# Patient Record
Sex: FEMALE | Race: WHITE | NOT HISPANIC OR LATINO | Employment: OTHER | URBAN - METROPOLITAN AREA
[De-identification: names, ages, dates, MRNs, and addresses within clinical notes are randomized per-mention and may not be internally consistent; named-entity substitution may affect disease eponyms.]

---

## 2019-10-17 ENCOUNTER — OFFICE VISIT (OUTPATIENT)
Dept: UROLOGY | Facility: CLINIC | Age: 84
End: 2019-10-17
Payer: MEDICARE

## 2019-10-17 VITALS
HEIGHT: 61 IN | SYSTOLIC BLOOD PRESSURE: 121 MMHG | WEIGHT: 153 LBS | HEART RATE: 80 BPM | TEMPERATURE: 98 F | BODY MASS INDEX: 28.89 KG/M2 | DIASTOLIC BLOOD PRESSURE: 70 MMHG

## 2019-10-17 DIAGNOSIS — N81.10 VAGINAL PROLAPSE: ICD-10-CM

## 2019-10-17 LAB
BILIRUB SERPL-MCNC: NEGATIVE MG/DL
BLOOD URINE, POC: NEGATIVE
COLOR, POC UA: YELLOW
GLUCOSE UR QL STRIP: NEGATIVE
KETONES UR QL STRIP: NEGATIVE
LEUKOCYTE ESTERASE URINE, POC: NEGATIVE
NITRITE, POC UA: NEGATIVE
PH, POC UA: 6.5
PROTEIN, POC: NEGATIVE
SPECIFIC GRAVITY, POC UA: 1.01
UROBILINOGEN, POC UA: 0.2

## 2019-10-17 PROCEDURE — 99499 UNLISTED E&M SERVICE: CPT | Mod: S$GLB,,, | Performed by: SPECIALIST

## 2019-10-17 PROCEDURE — 99499 NO LOS: ICD-10-PCS | Mod: S$GLB,,, | Performed by: SPECIALIST

## 2019-10-17 PROCEDURE — 51725 PR SIMPLE CYSTOMETROGRAM: ICD-10-PCS | Mod: 26,51,S$GLB, | Performed by: SPECIALIST

## 2019-10-17 PROCEDURE — 81002 URINALYSIS NONAUTO W/O SCOPE: CPT | Mod: S$GLB,,, | Performed by: SPECIALIST

## 2019-10-17 PROCEDURE — 52000 PR CYSTOURETHROSCOPY: ICD-10-PCS | Mod: 59,S$GLB,, | Performed by: SPECIALIST

## 2019-10-17 PROCEDURE — 81002 POCT URINE DIPSTICK WITHOUT MICROSCOPE: ICD-10-PCS | Mod: S$GLB,,, | Performed by: SPECIALIST

## 2019-10-17 PROCEDURE — 51725 SIMPLE CYSTOMETROGRAM: CPT | Mod: 26,51,S$GLB, | Performed by: SPECIALIST

## 2019-10-17 PROCEDURE — 52000 CYSTOURETHROSCOPY: CPT | Mod: 59,S$GLB,, | Performed by: SPECIALIST

## 2019-10-17 RX ORDER — MIRABEGRON 50 MG/1
TABLET, FILM COATED, EXTENDED RELEASE ORAL
COMMUNITY
Start: 2019-10-14 | End: 2019-11-21 | Stop reason: SDUPTHER

## 2019-10-17 RX ORDER — LATANOPROST 50 UG/ML
SOLUTION/ DROPS OPHTHALMIC
COMMUNITY
Start: 2019-09-25

## 2019-10-17 RX ORDER — LISINOPRIL AND HYDROCHLOROTHIAZIDE 12.5; 2 MG/1; MG/1
TABLET ORAL
COMMUNITY
Start: 2019-08-22 | End: 2021-06-25

## 2019-10-17 RX ORDER — CIPROFLOXACIN 500 MG/1
500 TABLET ORAL
Status: COMPLETED | OUTPATIENT
Start: 2019-10-17 | End: 2019-10-17

## 2019-10-17 RX ORDER — POTASSIUM CHLORIDE 750 MG/1
TABLET, EXTENDED RELEASE ORAL
COMMUNITY
Start: 2019-09-27 | End: 2023-12-06

## 2019-10-17 RX ORDER — CIPROFLOXACIN 500 MG/1
500 TABLET ORAL
Status: DISCONTINUED | OUTPATIENT
Start: 2019-10-17 | End: 2019-10-17

## 2019-10-17 RX ORDER — MELOXICAM 15 MG/1
TABLET ORAL
Refills: 1 | COMMUNITY
Start: 2019-10-02 | End: 2021-06-25

## 2019-10-17 RX ORDER — ROSUVASTATIN CALCIUM 10 MG/1
TABLET, COATED ORAL
COMMUNITY
Start: 2019-10-03

## 2019-10-17 RX ORDER — AMLODIPINE BESYLATE 5 MG/1
5 TABLET ORAL 2 TIMES DAILY
COMMUNITY
Start: 2019-08-22 | End: 2020-06-22 | Stop reason: ALTCHOICE

## 2019-10-17 RX ORDER — LEVOTHYROXINE SODIUM 100 UG/1
TABLET ORAL
COMMUNITY
End: 2021-06-25

## 2019-10-17 RX ADMIN — CIPROFLOXACIN 500 MG: 500 TABLET ORAL at 09:10

## 2019-10-17 NOTE — PROGRESS NOTES
Chief Complaint: POP    HPI: 82 y/o who has failed a previous POP repair    UA: normal, see lab results for values    Procedure: Diagnostic Cystoscopy/ Simple CMG and pelvic Exam    Procedure in Detail: After proper consents were obtained, the patient was prepped and draped in normal sterile fashion for diagnostic cystoscopy. 5 ml of lidocaine jelly was instilled in the urethra. A rigid cystoscope was introduced. Patient's bladder was surveiled in 4 quadrants. No abnormalities.     Simple CMG was performed. Bladder capacity was estimated at 350 cc, sensations were normal.    Pelvic exam was performed, patient was noted to have apical prolapse as well as a grade III cystocele, there was some minimal leakage of urine with valsalva.         Findings: normal    ABx: Ciprofloxacin 500 mg x 1        Impression/Plan:   Grade 3 cystocele with Vault prolapse.     - to the OR for Vaginal colpocleisis/perineorrhaphy with a MUS.   - I will look at a date and get patient scheduled.

## 2019-10-17 NOTE — PATIENT INSTRUCTIONS
Cystoscopy    Cystoscopy is a procedure that lets your doctor look directly inside your urethra and bladder. It can be used to:  · Help diagnose a problem with your urethra, bladder, or kidneys.  · Take a sample (biopsy) of bladder or urethral tissue.  · Treat certain problems (such as removing kidney stones).  · Place a stent to bypass an obstruction.  · Take special X-rays of the kidneys.  Based on the findings, your doctor may recommend other tests or treatments.  What is a cystoscope?  A cystoscope is a telescope-like instrument that contains lenses and fiberoptics (small glass wires that make bright light). The cystoscope may be straight and rigid, or flexible to bend around curves in the urethra. The doctor may look directly into the cystoscope, or project the image onto a monitor.  Getting ready  · Ask your doctor if you should stop taking any medicines before the procedure.  · Ask whether you should avoid eating or drinking anything after midnight before the procedure.  · Follow any other instructions your doctor gives you.  Tell your doctor before the exam if you:  · Take any medicines, such as aspirin or blood thinners  · Have allergies to any medicines  · Are pregnant   The procedure  Cystoscopy is done in the doctors office, surgery center, or hospital. The doctor and a nurse are present during the procedure. It takes only a few minutes, longer if a biopsy, X-ray, or treatment needs to be done.  During the procedure:  · You lie on an exam table on your back, knees bent and legs apart. You are covered with a drape.  · Your urethra and the area around it are washed. Anesthetic jelly may be applied to numb the urethra. Other pain medicine is usually not needed. In some cases, you may be offered a mild sedative to help you relax. If a more extensive procedure is to be done, such as a biopsy or kidney stone removal, general anesthesia may be needed.  · The cystoscope is inserted. A sterile fluid is put  into the bladder to expand it. You may feel pressure from this fluid.  · When the procedure is done, the cystoscope is removed.  After the procedure  If you had a sedative, general anesthesia, or spinal anesthesia, you must have someone drive you home. Once youre home:  · Drink plenty of fluids.  · You may have burning or light bleeding when you urinate--this is normal.  · Medicines may be prescribed to ease any discomfort or prevent infection. Take these as directed.  · Call your doctor if you have heavy bleeding or blood clots, burning that lasts more than a day, a fever over 100°F  (38° C), or trouble urinating.  Date Last Reviewed: 1/1/2017 © 2000-2017 [employer]. [address] [place] [address], [place] [address]. All rights reserved. This information is not intended as a substitute for professional medical care. Always follow your healthcare professional's instructions.        Pelvic Organ Prolapse  Pelvic organ prolapse is when 1 or more of the organs inside the pelvis (found between the waist and thighs), slip from their normal positions. Normally, muscles and tissues in the pelvic region support the pelvic organs and hold them in place.  What is a normal pelvis?     Cutaway view of pelvis showing the small intesting, bladder, pubic bone, urethra, pelvic floor muscles, uterus, vagina, and rectum.   A. The small intestine absorbs nutrients from food.  B. The bladder collects and holds urine.  C. The pubic bone helps protect the pelvic organs.  D. The urethra is the tube that carries urine out of the body.  E. The pelvic floor muscles support organs and other structures in the pelvis.  F. The uterus is where the baby develops when a women is pregnant.  G. The vagina is the canal from the uterus to the outside of the body.  H. The rectum stores stool until a bowel movement occurs.  What causes pelvic organ prolapse?   There are several causes of pelvic organ prolapse including:  · Vaginal  childbirth  · Genetic predisposition  · Connective tissue disorders  · Advancing age  · Constant coughing (such as with bronchitis or smoking)  · Heavy lifting  · Chronic straining (such as with constipation)  · Being overweight  What are the symptoms of pelvic organ prolapse?  The symptoms of pelvic organ prolapse include:  · A feeling of fullness or pressure in your pelvis  · A sense that a ball or lump is protruding from the vagina  · Problems passing urine or having a bowel movement  · Urine leakage when you cough or use stairs. (But this can happen even without prolapse.)   · Pain or pressure in your low back  · Problems with sexual intercourse  Date Last Reviewed: 5/10/2015  © 3367-3063 [employer]. [address] [place] [address], [place] [address]. All rights reserved. This information is not intended as a substitute for professional medical care. Always follow your healthcare professional's instructions.

## 2019-10-20 PROBLEM — N81.10 VAGINAL PROLAPSE: Status: ACTIVE | Noted: 2019-10-20

## 2019-10-20 NOTE — ASSESSMENT & PLAN NOTE
Patient is undergoing A Simple CMG, cystoscopy with pelvuic exam today as part of surgical  planning

## 2019-11-01 ENCOUNTER — CLINICAL SUPPORT (OUTPATIENT)
Dept: UROLOGY | Facility: CLINIC | Age: 84
End: 2019-11-01
Payer: MEDICARE

## 2019-11-01 DIAGNOSIS — N81.10 VAGINAL PROLAPSE: Primary | ICD-10-CM

## 2019-11-01 LAB
APPEARANCE, UA: CLEAR
BASOPHILS NFR SNV MANUAL: 0.9 % (ref 0–3)
BILIRUB UR QL STRIP: NEGATIVE MG/DL
BUN SERPL-MCNC: 34 MG/DL (ref 7–18)
BUN/CREAT SERPL: 36.55 RATIO (ref 7–18)
CALCIUM SERPL-MCNC: 9.8 MG/DL (ref 8.8–10.5)
CHLORIDE SERPL-SCNC: 102 MMOL/L (ref 100–108)
CO2 SERPL-SCNC: 33 MMOL/L (ref 21–32)
COLOR UR: NORMAL
CREAT SERPL-MCNC: 0.93 MG/DL (ref 0.55–1.02)
EOSINOPHIL NFR SNV MANUAL: 5.2 % (ref 1–3)
ERYTHROCYTE [DISTWIDTH] IN BLOOD BY AUTOMATED COUNT: 13.3 % (ref 12.5–18)
GFR ESTIMATION: 58
GLUCOSE (UA): NORMAL MG/DL
GLUCOSE SERPL-MCNC: 95 MG/DL (ref 70–110)
HCT VFR BLD AUTO: 36.1 % (ref 37–47)
HGB BLD-MCNC: 11.9 G/DL (ref 12–16)
HGB UR QL STRIP: NEGATIVE /UL
KETONES UR QL STRIP: NEGATIVE MG/DL
LEUKOCYTE ESTERASE UR QL STRIP: NEGATIVE /UL
LYMPHOCYTES NFR SNV MANUAL: 33 % (ref 25–40)
MANUAL NRBC PER 100 CELLS: 0 %
MCH RBC QN AUTO: 28.3 PG (ref 27–31.2)
MCHC RBC AUTO-ENTMCNC: 33 G/DL (ref 31.8–35.4)
MCV RBC AUTO: 86 FL (ref 80–97)
MONOCYTES/100 LEUKOCYTES: 6.3 % (ref 1–15)
NEUTROPHILS NFR BLD: 3.01 10*3/UL (ref 1.8–7.7)
NEUTROPHILS NFR SNV MANUAL: 54.4 % (ref 37–80)
NITRITE UR QL STRIP: NEGATIVE
PH UR STRIP: 7 PH (ref 5–9)
PLATELETS: 212 10*3/UL (ref 142–424)
POTASSIUM SERPL-SCNC: 3.4 MMOL/L (ref 3.6–5.2)
PROT UR QL STRIP: NEGATIVE MG/DL
RBC # BLD AUTO: 4.2 10*6/UL (ref 4.2–5.4)
SODIUM BLD-SCNC: 141 MMOL/L (ref 135–145)
SP GR UR STRIP: 1.01 (ref 1–1.03)
SPECIMEN COLLECTION METHOD, URINE: NORMAL
UROBILINOGEN UR STRIP-ACNC: NORMAL MG/DL
WBC # BLD: 5.5 10*3/UL (ref 4.6–10.2)

## 2019-11-05 ENCOUNTER — OUTSIDE PLACE OF SERVICE (OUTPATIENT)
Dept: UROLOGY | Facility: CLINIC | Age: 84
End: 2019-11-05
Payer: MEDICARE

## 2019-11-05 ENCOUNTER — TELEPHONE (OUTPATIENT)
Dept: UROLOGY | Facility: CLINIC | Age: 84
End: 2019-11-05

## 2019-11-05 PROCEDURE — 57288 REPAIR BLADDER DEFECT: CPT | Mod: 51,,, | Performed by: SPECIALIST

## 2019-11-05 PROCEDURE — 57288 PR SLING OPER STRES INCONTINENCE: ICD-10-PCS | Mod: 51,,, | Performed by: SPECIALIST

## 2019-11-05 PROCEDURE — 57265 CMBN AP COLPRHY W/NTRCL RPR: CPT | Mod: ,,, | Performed by: SPECIALIST

## 2019-11-05 PROCEDURE — 57265 PR COMBO ANT/POST COLPORR+ENTEROCELE: ICD-10-PCS | Mod: ,,, | Performed by: SPECIALIST

## 2019-11-05 NOTE — TELEPHONE ENCOUNTER
----- Message from Yanira Gibbons sent at 11/4/2019  3:14 PM CST -----  Contact: [facility]   Kamille called in regards to a clearence  for a procedure for the patient . Please call back at [phone]. Patient surgery is scheduled for tomorrow patient is in route now .  would like the information faxed over  ,Fax [phone]    Thanks,  Yanira Gibbons

## 2019-11-06 LAB
BUN SERPL-MCNC: 13 MG/DL (ref 7–18)
BUN/CREAT SERPL: 17.1 RATIO (ref 7–18)
CALCIUM SERPL-MCNC: 8.5 MG/DL (ref 8.8–10.5)
CHLORIDE SERPL-SCNC: 103 MMOL/L (ref 100–108)
CO2 SERPL-SCNC: 29 MMOL/L (ref 21–32)
CREAT SERPL-MCNC: 0.76 MG/DL (ref 0.55–1.02)
ERYTHROCYTE [DISTWIDTH] IN BLOOD BY AUTOMATED COUNT: 13.8 % (ref 12.5–18)
GFR ESTIMATION: > 60
GLUCOSE SERPL-MCNC: 96 MG/DL (ref 70–110)
HCT VFR BLD AUTO: 29.3 % (ref 37–47)
HGB BLD-MCNC: 9.5 G/DL (ref 12–16)
MANUAL NRBC PER 100 CELLS: 0 %
MCH RBC QN AUTO: 27.9 PG (ref 27–31.2)
MCHC RBC AUTO-ENTMCNC: 32.4 G/DL (ref 31.8–35.4)
MCV RBC AUTO: 86.2 FL (ref 80–97)
PLATELETS: 175 10*3/UL (ref 142–424)
POTASSIUM SERPL-SCNC: 3.8 MMOL/L (ref 3.6–5.2)
RBC # BLD AUTO: 3.4 10*6/UL (ref 4.2–5.4)
SODIUM BLD-SCNC: 138 MMOL/L (ref 135–145)
WBC # BLD: 6.7 10*3/UL (ref 4.6–10.2)

## 2019-11-11 ENCOUNTER — TELEPHONE (OUTPATIENT)
Dept: UROLOGY | Facility: CLINIC | Age: 84
End: 2019-11-11

## 2019-11-11 NOTE — TELEPHONE ENCOUNTER
----- Message from Glenny Centeno sent at 11/8/2019  3:28 PM CST -----  Contact: PATIENT   WOULD LIKE A RETURN CALL REGARDING A RX REFILL. PT CAN BE CONTACTED BEST @ [phone].  THANK YOU,   LUI MUÑOZ

## 2019-11-12 ENCOUNTER — TELEPHONE (OUTPATIENT)
Dept: UROLOGY | Facility: CLINIC | Age: 84
End: 2019-11-12

## 2019-11-12 NOTE — TELEPHONE ENCOUNTER
----- Message from Beverly Holloway sent at 11/11/2019 11:51 AM CST -----  Contact: pt  Type:  RX Refill Request    Who Called: MARY MALONEY   Refill or New Rx: refill  RX Name and Strength: Myrbetriq 50 mg  How is the patient currently taking it? (ex. 1XDay): 1 time daily  Is this a 30 day or 90 day RX: 90  Preferred Pharmacy with phone number:   [facility]    Local or Mail Order: Mail  Ordering Provider: Dr. Tucker  Would the patient rather a call back or a response via [facility]? Call  Best Call Back Number: [phone] (home)   Additional Information: Pt stated that a refill was sent over only for 1 month but pt stated that she would like a 3 month supply because it will be a lot cheaper instead of the 1 month supply.

## 2019-11-21 ENCOUNTER — OFFICE VISIT (OUTPATIENT)
Dept: UROLOGY | Facility: CLINIC | Age: 84
End: 2019-11-21
Payer: MEDICARE

## 2019-11-21 VITALS
DIASTOLIC BLOOD PRESSURE: 61 MMHG | RESPIRATION RATE: 18 BRPM | BODY MASS INDEX: 28.7 KG/M2 | WEIGHT: 152 LBS | HEIGHT: 61 IN | HEART RATE: 72 BPM | SYSTOLIC BLOOD PRESSURE: 129 MMHG

## 2019-11-21 DIAGNOSIS — N81.9 VAGINAL VAULT PROLAPSE: Primary | ICD-10-CM

## 2019-11-21 DIAGNOSIS — N32.81 OVERACTIVE BLADDER: ICD-10-CM

## 2019-11-21 LAB
BILIRUB UR QL STRIP: NEGATIVE
GLUCOSE UR QL STRIP: NEGATIVE
KETONES UR QL STRIP: NEGATIVE
LEUKOCYTE ESTERASE UR QL STRIP: NEGATIVE
PH, POC UA: 6
POC AMORP, URINE: ABNORMAL
POC BACTI, URINE: ABNORMAL
POC BLOOD, URINE: POSITIVE
POC CASTS, URINE: ABNORMAL
POC CRYST, URINE: ABNORMAL
POC EPITH, URINE: ABNORMAL
POC HCG, URINE: ABNORMAL
POC HYALIN, URINE: 0 LPF
POC MUCUS, URINE: ABNORMAL
POC NITRATES, URINE: NEGATIVE
POC OTHER, URINE: ABNORMAL
POC RBC, URINE: ABNORMAL HPF
POC WBC, URINE: ABNORMAL HPF
PROT UR QL STRIP: NEGATIVE
SP GR UR STRIP: 1.01 (ref 1–1.03)
UROBILINOGEN UR STRIP-ACNC: 0.2 (ref 0.1–1.1)

## 2019-11-21 PROCEDURE — 81001 POCT URINALYSIS (W/MICRO OPTION): ICD-10-PCS | Mod: S$GLB,,, | Performed by: NURSE PRACTITIONER

## 2019-11-21 PROCEDURE — 1159F PR MEDICATION LIST DOCUMENTED IN MEDICAL RECORD: ICD-10-PCS | Mod: S$GLB,,, | Performed by: NURSE PRACTITIONER

## 2019-11-21 PROCEDURE — 99214 PR OFFICE/OUTPT VISIT, EST, LEVL IV, 30-39 MIN: ICD-10-PCS | Mod: 25,S$GLB,, | Performed by: NURSE PRACTITIONER

## 2019-11-21 PROCEDURE — 1126F PR PAIN SEVERITY QUANTIFIED, NO PAIN PRESENT: ICD-10-PCS | Mod: S$GLB,,, | Performed by: NURSE PRACTITIONER

## 2019-11-21 PROCEDURE — 1159F MED LIST DOCD IN RCRD: CPT | Mod: S$GLB,,, | Performed by: NURSE PRACTITIONER

## 2019-11-21 PROCEDURE — 1126F AMNT PAIN NOTED NONE PRSNT: CPT | Mod: S$GLB,,, | Performed by: NURSE PRACTITIONER

## 2019-11-21 PROCEDURE — 81001 URINALYSIS AUTO W/SCOPE: CPT | Mod: S$GLB,,, | Performed by: NURSE PRACTITIONER

## 2019-11-21 PROCEDURE — 99214 OFFICE O/P EST MOD 30 MIN: CPT | Mod: 25,S$GLB,, | Performed by: NURSE PRACTITIONER

## 2019-11-21 NOTE — PROGRESS NOTES
Chief Complaint: f/u vaginal vault prolapse    HPI: 84 year old  female known to Dr. Tucker who presents for follow up vaginal vault prolapse.  Patient predominantly had a grade 2 cystocele with descent of the apex with a well-supported rectus.  She had undergone sacrospinous ligament suspension a few years ago in she failed that suspension.  She was initially planned for colpocleisis but intraoperatively a decision was made to do a A/P colporrhaphy with repair, perineorrhaphy, and placement of midurethral sling.  Patient did not have any complications postoperatively, was discharged home in stable condition.  She presents today with no complaints.  She denies any pelvic pain, hematuria, fever, or any other complications.      Allergies:  Codeine    Medications: has a current medication list which includes the following prescription(s): amlodipine, latanoprost, levothyroxine, lisinopril-hydrochlorothiazide, meloxicam, mirabegron, potassium chloride sa, and rosuvastatin.    Review of Systems:  General: No fever, chills, vision changes, dizziness, weakness, fatigue, unexplained weight loss, confusion, or mood swings.  Skin: No rashes, itching, or changes in color/texture of skin.  Chest: Denies HICKS, cyanosis, wheezing, cough, and sputum production.  Abdomen: Appetite fine. Denies diarrhea, abdominal pain, hematemesis, or blood in stool.  Musculoskeletal: No joint stiffness or swelling. Denies back pain.  : As above.  All other review of systems negative.    PMH:   has a past medical history of HTN (hypertension), Hyperlipidemia, Hyperthyroidism, IBS (irritable bowel syndrome), Incontinence of urine, OA (osteoarthritis), and Peripheral neuropathy.    PSH:   has a past surgical history that includes Hysterectomy and Vaginal prolapse repair.    FamHx: family history includes Hypertension in her father and mother.    SocHx:  reports that she has never smoked. She has never used smokeless tobacco. She reports  that she does not drink alcohol or use drugs.      Physical Exam:  Vitals:    11/21/19 0836   BP: 129/61   Pulse: 72   Resp: 18     General: AAOx3, no apparent distress, no deformities  Neck: supple, no masses, normal thyroid  Lungs: normal inspiration, no use of accessory muscles  Heart: regular rate and rhythm, no arrhythmias  Abdomen: soft, NT, ND, no masses, no hernias, no hepatosplenomegaly  Lymphatic: no unusually enlarged or tender lymph nodes  Skin: warm and dry, no jaundice.  Ext: without edema or deformity.  : deferred    Labs/Studies: UA shows RBCs 0-2/hpf otherwise negative    Impression/Plan:   Vaginal vault prolapse  Comments:  2 weeks s/p A/P colporrhaphy with repair, perineorrhaphy, and placement of midurethral sling, voices no complaints  Orders:  -     POCT Urinalysis (w/Micro Option)    Overactive bladder  Comments:  Continue Myrbetriq 50mg PO daily, [facility]  Orders:  -     mirabegron (MYRBETRIQ) 50 mg Tb24; Take 1 tablet (50 mg total) by mouth once daily.  Dispense: 90 tablet; Refill: 3        Follow up in about 4 weeks (around 12/19/2019).

## 2019-12-10 ENCOUNTER — TELEPHONE (OUTPATIENT)
Dept: UROLOGY | Facility: CLINIC | Age: 84
End: 2019-12-10

## 2019-12-10 NOTE — TELEPHONE ENCOUNTER
----- Message from Luis Worrell sent at 12/9/2019  1:42 PM CST -----  Contact: Pt   Pt would like a 3 month supply of mirabegron (MYRBETRIQ) 50 mg Tb24 sent to     [facility] - [address] [address]  Phone: [phone] Fax: [phone]    Pt can be reached at [phone] (home).

## 2019-12-10 NOTE — TELEPHONE ENCOUNTER
----- Message from Luis Worrell sent at 12/9/2019  1:42 PM CST -----  Contact: Pt   Pt would like a 3 month supply of mirabegron (MYRBETRIQ) 50 mg Tb24 sent to     [facility] - [address] [address]  Phone: [phone] Fax: [phone]    Pt can be reached at [phone] (home).

## 2019-12-20 ENCOUNTER — OFFICE VISIT (OUTPATIENT)
Dept: UROLOGY | Facility: CLINIC | Age: 84
End: 2019-12-20
Payer: MEDICARE

## 2019-12-20 VITALS — HEART RATE: 72 BPM | SYSTOLIC BLOOD PRESSURE: 130 MMHG | RESPIRATION RATE: 16 BRPM | DIASTOLIC BLOOD PRESSURE: 65 MMHG

## 2019-12-20 DIAGNOSIS — Z48.89 POSTOPERATIVE VISIT: Primary | ICD-10-CM

## 2019-12-20 PROCEDURE — 99024 POSTOP FOLLOW-UP VISIT: CPT | Mod: S$GLB,,, | Performed by: SPECIALIST

## 2019-12-20 PROCEDURE — 99024 PR POST-OP FOLLOW-UP VISIT: ICD-10-PCS | Mod: S$GLB,,, | Performed by: SPECIALIST

## 2019-12-20 NOTE — PROGRESS NOTES
Subjective:       Patient ID: Luna Miles is a 84 y.o. female.    Chief Complaint: Follow-up (4 week )      HPI:  84-year-old female had recurrent prolapse.  She underwent a posterior colporrhaphy anterior colporrhaphy perineorrhaphy and mid urethral sling at [facility] 11/05/2019.  She is presenting today for 1st vaginal exam.    She seems to be doing very well no symptoms at the moment.  Voids to completion empties her bladder very well.    About 3 years ago she did undergo sacral spinous ligament suspension that failed.  We had done just a single sided suspension.    Past Medical History:   Past Medical History:   Diagnosis Date    HTN (hypertension)     Hyperlipidemia     Hyperthyroidism     IBS (irritable bowel syndrome)     Incontinence of urine     OA (osteoarthritis)     Peripheral neuropathy        Past Surgical Historical:   Past Surgical History:   Procedure Laterality Date    HYSTERECTOMY      VAGINAL PROLAPSE REPAIR      Patient had a vaginal vault prolapse repair via a SSL suspension in 2015        Medications:   Medication List with Changes/Refills   Current Medications    AMLODIPINE (NORVASC) 5 MG TABLET    Take 5 mg by mouth 2 (two) times daily.     LATANOPROST 0.005 % OPHTHALMIC SOLUTION        LEVOTHYROXINE (SYNTHROID) 100 MCG TABLET    levothyroxine 100 mcg tablet   TK 1 T PO QD    LISINOPRIL-HYDROCHLOROTHIAZIDE (PRINZIDE,ZESTORETIC) 20-12.5 MG PER TABLET        MELOXICAM (MOBIC) 15 MG TABLET    TK 1 T PO QD    MIRABEGRON (MYRBETRIQ) 50 MG TB24    Take 1 tablet (50 mg total) by mouth once daily.    POTASSIUM CHLORIDE SA (K-DUR,KLOR-CON) 10 MEQ TABLET        ROSUVASTATIN (CRESTOR) 10 MG TABLET            Past Social History:   Social History     Socioeconomic History    Marital status:      Spouse name: Not on file    Number of children: Not on file    Years of education: Not on file    Highest education level: Not on file   Occupational History     Not on file   Social Needs    Financial resource strain: Not on file    Food insecurity:     Worry: Not on file     Inability: Not on file    Transportation needs:     Medical: Not on file     Non-medical: Not on file   Tobacco Use    Smoking status: Never Smoker    Smokeless tobacco: Never Used   Substance and Sexual Activity    Alcohol use: Never     Frequency: Never    Drug use: Never    Sexual activity: Not on file   Lifestyle    Physical activity:     Days per week: Not on file     Minutes per session: Not on file    Stress: Not on file   Relationships    Social connections:     Talks on phone: Not on file     Gets together: Not on file     Attends [employer] service: Not on file     Active member of club or organization: Not on file     Attends meetings of clubs or organizations: Not on file     Relationship status: Not on file   Other Topics Concern    Not on file   Social History Narrative    Not on file       Allergies:   Review of patient's allergies indicates:   Allergen Reactions    Codeine Rash        Family History:   Family History   Problem Relation Age of Onset    Hypertension Mother     Hypertension Father           Physical Exam:  Gen:  Alert and oriented x3; no acute distress  HEENT: NC/AT; PERRLA, Moist mucous membranes  Chest: CTAB  CVS: RR, Normal Rate, Normal cap refills  Abd: S/NT/ND, normal bowel sounds  :  Pelvic exam looks normal with adequate wall support      Assessment/Plan:       84-year-old female status post vaginal prolapse repair 11/05/2019 doing well    1.  Continue monitoring her return to clinic in 6 months for interim check    Problem List Items Addressed This Visit     None      Visit Diagnoses     Postoperative visit    -  Primary

## 2020-06-19 ENCOUNTER — TELEPHONE (OUTPATIENT)
Dept: UROLOGY | Facility: CLINIC | Age: 85
End: 2020-06-19

## 2020-06-19 NOTE — TELEPHONE ENCOUNTER
informed pt that call was to confirm her appt on monday 6/22/2020 at 0950 . pt verbalized understanding

## 2020-06-19 NOTE — TELEPHONE ENCOUNTER
----- Message from Tanisha Arriaga sent at 6/19/2020 10:39 AM CDT -----  Type:  Patient Returning Call    Who Called:Luna Miles  Who Left Message for Patient: N/A  Does the patient know what this is regarding?: Appointment reminder?  Would the patient rather a call back or a response via [facility]? Call back   Best Call Back Number:[phone] (cell)  Additional Information: Patient is stating that she has missed a call from Dr. Tucker's office

## 2020-06-22 ENCOUNTER — OFFICE VISIT (OUTPATIENT)
Dept: UROLOGY | Facility: CLINIC | Age: 85
End: 2020-06-22
Payer: MEDICARE

## 2020-06-22 VITALS
SYSTOLIC BLOOD PRESSURE: 141 MMHG | WEIGHT: 152 LBS | HEART RATE: 63 BPM | BODY MASS INDEX: 28.7 KG/M2 | DIASTOLIC BLOOD PRESSURE: 68 MMHG | RESPIRATION RATE: 17 BRPM | HEIGHT: 61 IN

## 2020-06-22 DIAGNOSIS — N81.10 VAGINAL PROLAPSE: Primary | ICD-10-CM

## 2020-06-22 LAB
BILIRUB UR QL STRIP: NEGATIVE
CLARITY, POC UA: CLEAR
COLOR, POC UA: YELLOW
GLUCOSE UR QL STRIP: NEGATIVE
KETONES UR QL STRIP: NEGATIVE
LEUKOCYTE ESTERASE UR QL STRIP: NEGATIVE
NITRITE, POC UA: NORMAL
PH, POC UA: 7
POC AMORP, URINE: NORMAL
POC BACTI, URINE: NORMAL
POC BLOOD, URINE: NEGATIVE
POC CASTS, URINE: NORMAL
POC CRYST, URINE: NORMAL
POC EPITH, URINE: NORMAL
POC HCG, URINE: NORMAL
POC HYALIN, URINE: 0 LPF
POC MUCUS, URINE: NORMAL
POC NITRATES, URINE: NEGATIVE
POC OTHER, URINE: NORMAL
POC RBC, URINE: 0 HPF
POC WBC, URINE: 0 HPF
PROT UR QL STRIP: NEGATIVE
SP GR UR STRIP: 1.02 (ref 1–1.03)
UROBILINOGEN UR STRIP-ACNC: 0.2 (ref 0.1–1.1)

## 2020-06-22 PROCEDURE — 99213 OFFICE O/P EST LOW 20 MIN: CPT | Mod: S$GLB,,, | Performed by: SPECIALIST

## 2020-06-22 PROCEDURE — 99213 PR OFFICE/OUTPT VISIT, EST, LEVL III, 20-29 MIN: ICD-10-PCS | Mod: S$GLB,,, | Performed by: SPECIALIST

## 2020-06-22 RX ORDER — BISOPROLOL FUMARATE 5 MG/1
TABLET, FILM COATED ORAL
COMMUNITY
Start: 2020-06-15

## 2020-06-22 RX ORDER — TRAMADOL HYDROCHLORIDE 50 MG/1
TABLET ORAL
COMMUNITY
Start: 2020-06-09 | End: 2021-06-25

## 2020-06-22 RX ORDER — MONTELUKAST SODIUM 10 MG/1
TABLET ORAL
COMMUNITY
Start: 2020-05-06 | End: 2023-12-06

## 2020-06-22 RX ORDER — LISINOPRIL AND HYDROCHLOROTHIAZIDE 20; 25 MG/1; MG/1
TABLET ORAL
COMMUNITY
Start: 2020-04-22

## 2020-06-22 RX ORDER — CYCLOBENZAPRINE HCL 5 MG
TABLET ORAL
COMMUNITY
Start: 2020-06-09 | End: 2022-06-29 | Stop reason: ALTCHOICE

## 2020-06-22 NOTE — PROGRESS NOTES
Subjective:       Patient ID: Luna Miles is a 84 y.o. female.    Chief Complaint: Other (6 mth f/u)      HPI:  84-year-old female who underwent a sacral spinous ligament suspension vaginal vault prolapse in March 2017.  By 2019 she did experience a failure.  She was going about a business and felt a pop and after that started experiencing prolapsing of the apex of the vagina.  In November 2019 she was taken back to the operating room for anterior posterior repair as well as a perineorrhaphy and placement of a sling.  She has been doing very well since that procedure.  She voids to completion.  She reports that she goes to the bathroom she feels like she has done then she tries again as he gets more urine coming out.  She denies any leakage.  Denies any infections.  BladderScan today was 17 cc.    Past Medical History:   Past Medical History:   Diagnosis Date    HTN (hypertension)     Hyperlipidemia     Hyperthyroidism     IBS (irritable bowel syndrome)     Incontinence of urine     OA (osteoarthritis)     Peripheral neuropathy        Past Surgical Historical:   Past Surgical History:   Procedure Laterality Date    HYSTERECTOMY      LAMINECTOMY      VAGINAL PROLAPSE REPAIR      Patient had a vaginal vault prolapse repair via a SSL suspension in 2015        Medications:   Medication List with Changes/Refills   Current Medications    BISOPROLOL (ZEBETA) 5 MG TABLET        CYCLOBENZAPRINE (FLEXERIL) 5 MG TABLET        LATANOPROST 0.005 % OPHTHALMIC SOLUTION        LEVOTHYROXINE (SYNTHROID) 100 MCG TABLET    levothyroxine 100 mcg tablet   TK 1 T PO QD    LISINOPRIL-HYDROCHLOROTHIAZIDE (PRINZIDE,ZESTORETIC) 20-12.5 MG PER TABLET        LISINOPRIL-HYDROCHLOROTHIAZIDE (PRINZIDE,ZESTORETIC) 20-25 MG TAB        MELOXICAM (MOBIC) 15 MG TABLET    TK 1 T PO QD    MIRABEGRON (MYRBETRIQ) 50 MG TB24    Take 1 tablet (50 mg total) by mouth once daily.    MONTELUKAST (SINGULAIR) 10 MG TABLET        POTASSIUM  CHLORIDE SA (K-DUR,KLOR-CON) 10 MEQ TABLET        ROSUVASTATIN (CRESTOR) 10 MG TABLET        TRAMADOL (ULTRAM) 50 MG TABLET       Discontinued Medications    AMLODIPINE (NORVASC) 5 MG TABLET    Take 5 mg by mouth 2 (two) times daily.         Past Social History:   Social History     Socioeconomic History    Marital status:      Spouse name: Not on file    Number of children: Not on file    Years of education: Not on file    Highest education level: Not on file   Occupational History    Not on file   Social Needs    Financial resource strain: Not on file    Food insecurity     Worry: Not on file     Inability: Not on file    Transportation needs     Medical: Not on file     Non-medical: Not on file   Tobacco Use    Smoking status: Never Smoker    Smokeless tobacco: Never Used   Substance and Sexual Activity    Alcohol use: Never     Frequency: Never    Drug use: Never    Sexual activity: Not on file   Lifestyle    Physical activity     Days per week: Not on file     Minutes per session: Not on file    Stress: Not on file   Relationships    Social connections     Talks on phone: Not on file     Gets together: Not on file     Attends [employer] service: Not on file     Active member of club or organization: Not on file     Attends meetings of clubs or organizations: Not on file     Relationship status: Not on file   Other Topics Concern    Not on file   Social History Narrative    Not on file       Allergies:   Review of patient's allergies indicates:   Allergen Reactions    Codeine Rash        Family History:   Family History   Problem Relation Age of Onset    Hypertension Mother     Hypertension Father         Review of Systems:   systems reviewed and notable for history of prolapse repair and sling placement  All other systems were reviewed Neg except as stated in the HPI    Physical Exam:  General: A&Ox3. No apparent distress. No deformities.  Neck: No masses. Normal thyroid.  Lungs:  normal inspiration. No use of accessory muscles.  Heart: normal pulse. No arrhythmias.  Abdomen: Soft. NT. ND. No masses. No hernias. No hepatosplenomegaly.  Lymphatic: Neck and groin nodes negative.  Skin: The skin is warm and dry. No jaundice.  Neurology: Cranial nerves 2-12 crossly intact, no focal weaknesses, no sensation deficits, no motor deficits  Ext: No clubbing, cyanosis or edema.  :  Deferred    Urinalysis:  Negative  Bladder scan for residual urine:  17 cc    Assessment/Plan:       84-year-old female who underwent a prolapse repair November 2019 here for follow-up.    1.  Bladder scan confirmed today that she is emptying her bladder  2.  Urinalysis was completely negative  3.  No clinical evidence of prolapse recurring  4.  Return to clinic in 1 year    Problem List Items Addressed This Visit        Renal/    Vaginal prolapse - Primary

## 2021-05-25 PROBLEM — M19.012 PRIMARY OSTEOARTHRITIS OF LEFT SHOULDER: Status: ACTIVE | Noted: 2021-05-25

## 2021-05-25 PROBLEM — Z96.619 PRESENCE OF SHOULDER JOINT PROSTHESIS: Status: ACTIVE | Noted: 2021-05-25

## 2021-06-25 ENCOUNTER — OFFICE VISIT (OUTPATIENT)
Dept: UROLOGY | Facility: CLINIC | Age: 86
End: 2021-06-25
Payer: MEDICARE

## 2021-06-25 DIAGNOSIS — N32.81 OVERACTIVE BLADDER: ICD-10-CM

## 2021-06-25 DIAGNOSIS — R39.15 URINARY URGENCY: ICD-10-CM

## 2021-06-25 DIAGNOSIS — N81.10 VAGINAL PROLAPSE: Primary | ICD-10-CM

## 2021-06-25 PROCEDURE — 99214 PR OFFICE/OUTPT VISIT, EST, LEVL IV, 30-39 MIN: ICD-10-PCS | Mod: S$GLB,,, | Performed by: NURSE PRACTITIONER

## 2021-06-25 PROCEDURE — 99214 OFFICE O/P EST MOD 30 MIN: CPT | Mod: S$GLB,,, | Performed by: NURSE PRACTITIONER

## 2021-06-25 RX ORDER — LEVOTHYROXINE SODIUM 88 UG/1
88 TABLET ORAL DAILY
COMMUNITY
Start: 2021-01-28

## 2021-06-25 RX ORDER — MIRABEGRON 50 MG/1
1 TABLET, FILM COATED, EXTENDED RELEASE ORAL DAILY
Qty: 90 TABLET | Refills: 3 | Status: SHIPPED | OUTPATIENT
Start: 2021-06-25 | End: 2023-12-06

## 2021-06-25 RX ORDER — SODIUM FLUORIDE 1.1 G/100G
CREAM ORAL
COMMUNITY
Start: 2021-06-04

## 2021-06-25 RX ORDER — MIRABEGRON 50 MG/1
1 TABLET, FILM COATED, EXTENDED RELEASE ORAL DAILY
Qty: 90 TABLET | Refills: 3 | Status: SHIPPED | OUTPATIENT
Start: 2021-06-25 | End: 2021-06-25 | Stop reason: SDUPTHER

## 2022-06-29 ENCOUNTER — OFFICE VISIT (OUTPATIENT)
Dept: UROLOGY | Facility: CLINIC | Age: 87
End: 2022-06-29
Payer: MEDICARE

## 2022-06-29 VITALS
BODY MASS INDEX: 32.1 KG/M2 | TEMPERATURE: 97 F | HEART RATE: 63 BPM | SYSTOLIC BLOOD PRESSURE: 169 MMHG | DIASTOLIC BLOOD PRESSURE: 78 MMHG | HEIGHT: 61 IN | WEIGHT: 170 LBS | RESPIRATION RATE: 20 BRPM

## 2022-06-29 DIAGNOSIS — N81.10 VAGINAL PROLAPSE: Primary | ICD-10-CM

## 2022-06-29 DIAGNOSIS — R39.15 URINARY URGENCY: ICD-10-CM

## 2022-06-29 DIAGNOSIS — N32.81 OAB (OVERACTIVE BLADDER): ICD-10-CM

## 2022-06-29 LAB — POC RESIDUAL URINE VOLUME: 79 ML (ref 0–100)

## 2022-06-29 PROCEDURE — 99214 PR OFFICE/OUTPT VISIT, EST, LEVL IV, 30-39 MIN: ICD-10-PCS | Mod: S$GLB,,, | Performed by: NURSE PRACTITIONER

## 2022-06-29 PROCEDURE — 51798 US URINE CAPACITY MEASURE: CPT | Mod: S$GLB,,, | Performed by: NURSE PRACTITIONER

## 2022-06-29 PROCEDURE — 99214 OFFICE O/P EST MOD 30 MIN: CPT | Mod: S$GLB,,, | Performed by: NURSE PRACTITIONER

## 2022-06-29 PROCEDURE — 51798 POCT BLADDER SCAN: ICD-10-PCS | Mod: S$GLB,,, | Performed by: NURSE PRACTITIONER

## 2022-06-29 RX ORDER — FUROSEMIDE 20 MG/1
TABLET ORAL
COMMUNITY
Start: 2022-02-12

## 2022-06-29 NOTE — PROGRESS NOTES
Subjective:       Patient ID: Luna Miles is a 86 y.o. female.    Chief Complaint: Urinary Incontinence and Urinary Frequency      HPI: 86-year-old female, established patient, presents for yearly visit.  Patient has a history of a vaginal prolapse.  Patient had surgical repair with Dr. Tucker.  Patient also has a history of urgency and overactive bladder.  Patient has been on Myrbetriq 50 mg.  Patient feels like this medication is longer providing any improvement.  She states he stopped taking it for a few days and noticed no change in symptoms.  Her biggest complaint is urgency.  She does not have any significant frequency or leakage.  Does have occasional leakage which is worse at night.  She does admit to drinking occasional tea.  She also drinks seltzer water.    She denies any pain or burning urination.  Denies any odor urine.  Denies any fever or body aches.  States he has a good stream from start to finish.  Denies having strain to void.  Denies any blood in urine.  No other urinary complaints at this time.       Past Medical History:   Past Medical History:   Diagnosis Date    HTN (hypertension)     Hyperlipidemia     Hyperthyroidism     IBS (irritable bowel syndrome)     Incontinence of urine     OA (osteoarthritis)     Peripheral neuropathy        Past Surgical Historical:   Past Surgical History:   Procedure Laterality Date    CARPAL TUNNEL RELEASE Right     FORAMINOTOMY  02/03/2020    L3-L5 [facility]IS-[facility]    HEMILAMINECTOMY  02/03/2020    L3-L5 MIS -[facility]    HYSTERECTOMY      HYSTERECTOMY      KNEE SURGERY Bilateral     [facility]     ROBOT-ASSISTED REPAIR OF BLADDER      SHOULDER SURGERY Left 12/09/2020    Reverse shoulder [facility]    SHOULDER SURGERY Right     RTC Repair-[facility]    SHOULDER SURGERY Left 12/09/2020    [facility] / [facility]    TONSILLECTOMY      VAGINAL PROLAPSE REPAIR      Patient had a vaginal vault prolapse repair via a SSL suspension in 2015        Medications:    Medication List with Changes/Refills   Current Medications    BISOPROLOL (ZEBETA) 5 MG TABLET        CYCLOBENZAPRINE (FLEXERIL) 5 MG TABLET        DENTA 5000 PLUS 1.1 % CREA    USE AS DIRECTED TWICE DAILY    FUROSEMIDE (LASIX) 20 MG TABLET        LATANOPROST 0.005 % OPHTHALMIC SOLUTION        LISINOPRIL-HYDROCHLOROTHIAZIDE (PRINZIDE,ZESTORETIC) 20-25 MG TAB        MONTELUKAST (SINGULAIR) 10 MG TABLET        MYRBETRIQ 50 MG TB24    Take 1 tablet (50 mg total) by mouth once daily.    POTASSIUM CHLORIDE SA (K-DUR,KLOR-CON) 10 MEQ TABLET        ROSUVASTATIN (CRESTOR) 10 MG TABLET        SYNTHROID 88 MCG TABLET    Take 88 mcg by mouth once daily.        Past Social History:   Social History     Socioeconomic History    Marital status:    Tobacco Use    Smoking status: Never Smoker    Smokeless tobacco: Never Used   Substance and Sexual Activity    Alcohol use: Never    Drug use: Never       Allergies:   Review of patient's allergies indicates:   Allergen Reactions    Codeine Rash        Family History:   Family History   Problem Relation Age of Onset    Hypertension Mother     Hypertension Father         Review of Systems:  Review of Systems   Constitutional: Negative for activity change and appetite change.   HENT: Negative for congestion and dental problem.    Respiratory: Negative for chest tightness and shortness of breath.    Cardiovascular: Negative for chest pain.   Gastrointestinal: Negative for abdominal distention and abdominal pain.   Genitourinary: Positive for urgency. Negative for decreased urine volume, difficulty urinating, dyspareunia, dysuria, enuresis, flank pain, frequency, genital sores, hematuria and pelvic pain.   Musculoskeletal: Negative for back pain and neck pain.   Allergic/Immunologic: Negative for immunocompromised state.   Neurological: Negative for dizziness.   Hematological: Negative for adenopathy.   Psychiatric/Behavioral: Negative for agitation, behavioral problems and  confusion.       Physical Exam:  Physical Exam  Vitals and nursing note reviewed.   Constitutional:       Appearance: She is well-developed.   HENT:      Head: Normocephalic.   Eyes:      Pupils: Pupils are equal, round, and reactive to light.   Cardiovascular:      Rate and Rhythm: Normal rate and regular rhythm.      Heart sounds: Normal heart sounds.   Pulmonary:      Effort: Pulmonary effort is normal.      Breath sounds: Normal breath sounds.   Abdominal:      General: Bowel sounds are normal.      Palpations: Abdomen is soft.   Musculoskeletal:         General: Normal range of motion.      Cervical back: Normal range of motion and neck supple.   Skin:     General: Skin is warm and dry.   Neurological:      Mental Status: She is alert and oriented to person, place, and time.   Psychiatric:         Behavior: Behavior normal.       Urinalysis:  Normal  Bladder scan:  79 cc.  Repeat bladder scan:  0 cc    Assessment/Plan:   1. Vaginal prolapse:  Patient doing well no complaints or complications.    2. Urinary urgency/overactive bladder:  Patient reports that it Myrbetriq 50 mg is no longer working.  Patient states that Dr. Tucker had discussed other medications which may have some side effects.  I discussed with the patient anticholinergic such as oxybutynin or VESIcare.  Discussed the risk of dementia.  Patient is hesitant to start these medications due to the risk of dementia.  We then discussed Botox injections.  Patient provided literature on Botox.  She will notify us if she would like to proceed.  She will notify us if she would like to meet with Dr. Soto for further questioning.    Follow-up to be arranged.  Problem List Items Addressed This Visit        Renal/    Vaginal prolapse - Primary    Overview     Patient had a sacral spinous ligament suspension of vaginal vault prolapse in March 2017.  In 2019 patient had failure.  In November 2019 patient had a anterior and posterior repair with sling.            Relevant Orders    POCT Bladder Scan      Other Visit Diagnoses     Urinary urgency        Relevant Orders    POCT Bladder Scan    POCT Urinalysis (w/Micro Option)    OAB (overactive bladder)        Relevant Orders    POCT Bladder Scan    POCT Urinalysis (w/Micro Option)

## 2022-08-15 ENCOUNTER — TELEPHONE (OUTPATIENT)
Dept: UROLOGY | Facility: CLINIC | Age: 87
End: 2022-08-15
Payer: MEDICARE

## 2022-08-15 NOTE — TELEPHONE ENCOUNTER
Patient called and stated after discussing with family and PCP would like to proceed with botox. [facility] LPN    ----- Message from Angeli Mcclain sent at 8/15/2022 11:05 AM CDT -----  Contact: pt      Who Called:partick   Does the patient know what this is regarding?:botox treatment   Would the patient rather a call back or a response via [facility]?   Best Call Back Number:.[phone]    Additional Information:

## 2022-08-23 ENCOUNTER — TELEPHONE (OUTPATIENT)
Dept: UROLOGY | Facility: CLINIC | Age: 87
End: 2022-08-23
Payer: MEDICARE

## 2022-08-23 NOTE — TELEPHONE ENCOUNTER
----- Message from Adam Martin NP sent at 8/16/2022  5:01 PM CDT -----  Contact: pt  86 year old female wants botox.  Last seen in June.      ----- Message -----  From: Savana Pearson RN  Sent: 8/15/2022  11:56 AM CDT  To: Adam Martin NP    Patient stated she has discussed botox treatment with family and PCP and would like to proceed with botox. [facility] LPN    ----- Message -----  From: Angeli Mcclain  Sent: 8/15/2022  11:06 AM CDT  To: Mario Medina Staff        Who Called:patrick   Does the patient know what this is regarding?:botox treatment   Would the patient rather a call back or a response via [facility]?   Best Call Back Number:.[phone]    Additional Information:

## 2022-08-26 ENCOUNTER — CLINICAL SUPPORT (OUTPATIENT)
Dept: UROLOGY | Facility: CLINIC | Age: 87
End: 2022-08-26
Payer: MEDICARE

## 2022-08-26 DIAGNOSIS — N32.81 OAB (OVERACTIVE BLADDER): Primary | ICD-10-CM

## 2022-08-26 DIAGNOSIS — R39.15 URINARY URGENCY: Primary | ICD-10-CM

## 2022-08-26 LAB
ANION GAP SERPL CALC-SCNC: 6 MMOL/L (ref 3–11)
APPEARANCE, UA: CLEAR
BASOPHILS NFR BLD: 1 % (ref 0–3)
BILIRUB UR QL STRIP: NEGATIVE MG/DL
BUN SERPL-MCNC: 18 MG/DL (ref 7–18)
BUN/CREAT SERPL: 25.35 RATIO (ref 7–18)
CALCIUM SERPL-MCNC: 9.1 MG/DL (ref 8.8–10.5)
CHLORIDE SERPL-SCNC: 104 MMOL/L (ref 100–108)
CO2 SERPL-SCNC: 31 MMOL/L (ref 21–32)
COLOR UR: NORMAL
CREAT SERPL-MCNC: 0.71 MG/DL (ref 0.55–1.02)
EOSINOPHIL NFR BLD: 4.4 % (ref 1–3)
ERYTHROCYTE [DISTWIDTH] IN BLOOD BY AUTOMATED COUNT: 13.6 % (ref 12.5–18)
GFR ESTIMATION: > 60
GLUCOSE (UA): NORMAL MG/DL
GLUCOSE SERPL-MCNC: 87 MG/DL (ref 70–110)
HCT VFR BLD AUTO: 37.7 % (ref 37–47)
HGB BLD-MCNC: 12.4 G/DL (ref 12–16)
HGB UR QL STRIP: NEGATIVE /UL
KETONES UR QL STRIP: NEGATIVE MG/DL
LEUKOCYTE ESTERASE UR QL STRIP: NEGATIVE /UL
LYMPHOCYTES NFR BLD: 34.5 % (ref 25–40)
MCH RBC QN AUTO: 28.4 PG (ref 27–31.2)
MCHC RBC AUTO-ENTMCNC: 32.9 G/DL (ref 31.8–35.4)
MCV RBC AUTO: 86.5 FL (ref 80–97)
MONOCYTES NFR BLD: 6.6 % (ref 1–15)
NEUTROPHILS # BLD AUTO: 2.65 10*3/UL (ref 1.8–7.7)
NEUTROPHILS NFR BLD: 53.3 % (ref 37–80)
NITRITE UR QL STRIP: NEGATIVE
NUCLEATED RED BLOOD CELLS: 0 %
PH UR STRIP: 7 PH (ref 5–9)
PLATELETS: 188 10*3/UL (ref 142–424)
POTASSIUM SERPL-SCNC: 3.6 MMOL/L (ref 3.6–5.2)
PROT UR QL STRIP: NEGATIVE MG/DL
RBC # BLD AUTO: 4.36 10*6/UL (ref 4.2–5.4)
SODIUM BLD-SCNC: 141 MMOL/L (ref 135–145)
SP GR UR STRIP: 1.01 (ref 1–1.03)
SPECIMEN COLLECTION METHOD, URINE: NORMAL
UROBILINOGEN UR STRIP-ACNC: NORMAL MG/DL
WBC # BLD: 5 10*3/UL (ref 4.6–10.2)

## 2022-08-26 NOTE — PROGRESS NOTES
Pre-op instructions and surgery consent BOTOX reviewed with pt. Pt verbalized understanding. Surgery consent signed by pt.

## 2022-08-29 ENCOUNTER — TELEPHONE (OUTPATIENT)
Dept: UROLOGY | Facility: CLINIC | Age: 87
End: 2022-08-29
Payer: MEDICARE

## 2022-08-29 NOTE — TELEPHONE ENCOUNTER
Contacted pt, advised that she does not need to stop taking Myrbetriq. Pt verbalized understanding. BJP    ----- Message from Angeli Mcclain sent at 8/29/2022 11:44 AM CDT -----  Contact: pt  Pt wants to know if she has to stoop taking mybetriq before getting botox injection   [phone]

## 2022-09-02 ENCOUNTER — TELEPHONE (OUTPATIENT)
Dept: UROLOGY | Facility: CLINIC | Age: 87
End: 2022-09-02
Payer: MEDICARE

## 2022-09-02 NOTE — TELEPHONE ENCOUNTER
Callback to pt, she states the soonest she can get in for her clearance is Tuesday at 2 and wants to know if that will be fine. Spoke with Mrs. Boggs she states that as long as they send/fax results to her, she will immediately send to [facility] so the pt can proceed with procedure. Advised pt, she verbalized understanding. RENEE

## 2022-09-07 ENCOUNTER — OUTSIDE PLACE OF SERVICE (OUTPATIENT)
Dept: UROLOGY | Facility: CLINIC | Age: 87
End: 2022-09-07

## 2022-09-07 PROCEDURE — 52287 CYSTOSCOPY CHEMODENERVATION: CPT | Mod: ,,, | Performed by: UROLOGY

## 2022-09-07 PROCEDURE — 52287 PR CYSTOURETHROSCOPY WITH INJ FOR CHEMODENERVATION: ICD-10-PCS | Mod: ,,, | Performed by: UROLOGY

## 2022-09-07 RX ORDER — HYDROCODONE BITARTRATE AND ACETAMINOPHEN 10; 325 MG/1; MG/1
1 TABLET ORAL EVERY 6 HOURS PRN
Qty: 10 TABLET | Refills: 0 | Status: SHIPPED | OUTPATIENT
Start: 2022-09-07 | End: 2023-03-10 | Stop reason: SDUPTHER

## 2022-09-07 RX ORDER — CIPROFLOXACIN 500 MG/1
500 TABLET ORAL 2 TIMES DAILY
Qty: 6 TABLET | Refills: 0 | Status: SHIPPED | OUTPATIENT
Start: 2022-09-07 | End: 2022-09-10

## 2022-09-29 ENCOUNTER — OFFICE VISIT (OUTPATIENT)
Dept: UROLOGY | Facility: CLINIC | Age: 87
End: 2022-09-29
Payer: MEDICARE

## 2022-09-29 VITALS — SYSTOLIC BLOOD PRESSURE: 130 MMHG | TEMPERATURE: 99 F | HEART RATE: 75 BPM | DIASTOLIC BLOOD PRESSURE: 85 MMHG

## 2022-09-29 DIAGNOSIS — N39.41 URGE URINARY INCONTINENCE: Primary | ICD-10-CM

## 2022-09-29 PROCEDURE — 99024 POSTOP FOLLOW-UP VISIT: CPT | Mod: S$GLB,,, | Performed by: UROLOGY

## 2022-09-29 PROCEDURE — 1126F PR PAIN SEVERITY QUANTIFIED, NO PAIN PRESENT: ICD-10-PCS | Mod: CPTII,S$GLB,, | Performed by: UROLOGY

## 2022-09-29 PROCEDURE — 1159F MED LIST DOCD IN RCRD: CPT | Mod: CPTII,S$GLB,, | Performed by: UROLOGY

## 2022-09-29 PROCEDURE — 99024 PR POST-OP FOLLOW-UP VISIT: ICD-10-PCS | Mod: S$GLB,,, | Performed by: UROLOGY

## 2022-09-29 PROCEDURE — 1159F PR MEDICATION LIST DOCUMENTED IN MEDICAL RECORD: ICD-10-PCS | Mod: CPTII,S$GLB,, | Performed by: UROLOGY

## 2022-09-29 PROCEDURE — 1126F AMNT PAIN NOTED NONE PRSNT: CPT | Mod: CPTII,S$GLB,, | Performed by: UROLOGY

## 2022-09-29 NOTE — PROGRESS NOTES
Postop Botox doing very well patient is retaining proximally 340 cc but is asymptomatic not having any issues with urinary tract infections.  Patient was instructed return to clinic when she has return of bothersome urinary symptoms

## 2023-02-10 ENCOUNTER — TELEPHONE (OUTPATIENT)
Dept: UROLOGY | Facility: CLINIC | Age: 88
End: 2023-02-10
Payer: MEDICARE

## 2023-02-10 NOTE — TELEPHONE ENCOUNTER
----- Message from Charisma Stanford sent at 2/10/2023 10:20 AM CST -----  Contact: self  Pt needs to schedule new botox injection she state the old one has bout wore out [facility] call [phone]   With appt details

## 2023-02-10 NOTE — TELEPHONE ENCOUNTER
Offered pt venus next avai. In April. She declined and req to see hanane. [facility]t [facility]. [facility]

## 2023-02-13 ENCOUNTER — OFFICE VISIT (OUTPATIENT)
Dept: UROLOGY | Facility: CLINIC | Age: 88
End: 2023-02-13
Payer: MEDICARE

## 2023-02-13 VITALS — DIASTOLIC BLOOD PRESSURE: 70 MMHG | SYSTOLIC BLOOD PRESSURE: 141 MMHG | HEART RATE: 68 BPM

## 2023-02-13 DIAGNOSIS — R39.15 URINARY URGENCY: ICD-10-CM

## 2023-02-13 DIAGNOSIS — N39.41 URGE INCONTINENCE: Primary | ICD-10-CM

## 2023-02-13 LAB — POC RESIDUAL URINE VOLUME: 5 ML (ref 0–100)

## 2023-02-13 PROCEDURE — 99214 OFFICE O/P EST MOD 30 MIN: CPT | Mod: S$GLB,,, | Performed by: NURSE PRACTITIONER

## 2023-02-13 PROCEDURE — 51798 POCT BLADDER SCAN: ICD-10-PCS | Mod: S$GLB,,, | Performed by: NURSE PRACTITIONER

## 2023-02-13 PROCEDURE — 1160F RVW MEDS BY RX/DR IN RCRD: CPT | Mod: CPTII,S$GLB,, | Performed by: NURSE PRACTITIONER

## 2023-02-13 PROCEDURE — 1159F MED LIST DOCD IN RCRD: CPT | Mod: CPTII,S$GLB,, | Performed by: NURSE PRACTITIONER

## 2023-02-13 PROCEDURE — 1160F PR REVIEW ALL MEDS BY PRESCRIBER/CLIN PHARMACIST DOCUMENTED: ICD-10-PCS | Mod: CPTII,S$GLB,, | Performed by: NURSE PRACTITIONER

## 2023-02-13 PROCEDURE — 1159F PR MEDICATION LIST DOCUMENTED IN MEDICAL RECORD: ICD-10-PCS | Mod: CPTII,S$GLB,, | Performed by: NURSE PRACTITIONER

## 2023-02-13 PROCEDURE — 51798 US URINE CAPACITY MEASURE: CPT | Mod: S$GLB,,, | Performed by: NURSE PRACTITIONER

## 2023-02-13 PROCEDURE — 99214 PR OFFICE/OUTPT VISIT, EST, LEVL IV, 30-39 MIN: ICD-10-PCS | Mod: S$GLB,,, | Performed by: NURSE PRACTITIONER

## 2023-02-13 RX ORDER — ASPIRIN 81 MG/1
81 TABLET ORAL DAILY
COMMUNITY

## 2023-02-13 NOTE — PROGRESS NOTES
Subjective:       Patient ID: Luna Miles is a 87 y.o. female.    Chief Complaint: No chief complaint on file.      HPI: 87-year-old female, established patient, seen September 2022.    Patient has history of urge incontinence urinary urgency.    On 09/07/2022 patient had 100 units of Botox.  Prior to that patient had failed Mybetriq.  Patient did have relief with Botox injections.  However, she states she is having return of symptoms.    She started to have episodes of urinary urgency.  She does have occasional leakage and frequency.      She denies any pain or burning urination.  Denies any odor urine.  Denies any fever body aches.  Denies any blood in urine.  Denies any significant weight loss.    No other urinary complaints at this time.       Past Medical History:   Past Medical History:   Diagnosis Date    HTN (hypertension)     Hyperlipidemia     Hyperthyroidism     IBS (irritable bowel syndrome)     Incontinence of urine     OA (osteoarthritis)     Peripheral neuropathy        Past Surgical Historical:   Past Surgical History:   Procedure Laterality Date    CARPAL TUNNEL RELEASE Right     FORAMINOTOMY  02/03/2020    L3-L5 MIS-[facility]    HEMILAMINECTOMY  02/03/2020    L3-L5 MIS -[facility]    HYSTERECTOMY      HYSTERECTOMY      KNEE SURGERY Bilateral     [facility]- [facility]     ROBOT-ASSISTED REPAIR OF BLADDER      SHOULDER SURGERY Left 12/09/2020    Reverse shoulder [facility]    SHOULDER SURGERY Right     RTC Repair-[facility]    SHOULDER SURGERY Left 12/09/2020    [facility] / [facility]    TONSILLECTOMY      VAGINAL PROLAPSE REPAIR      Patient had a vaginal vault prolapse repair via a SSL suspension in 2015        Medications:   Medication List with Changes/Refills   Current Medications    ASPIRIN (ECOTRIN) 81 MG EC TABLET    Take 81 mg by mouth once daily.    BISOPROLOL (ZEBETA) 5 MG TABLET        DENTA 5000 PLUS 1.1 % CREA    USE AS DIRECTED TWICE DAILY    FUROSEMIDE (LASIX) 20 MG TABLET        HYDROCODONE-ACETAMINOPHEN  (NORCO)  MG PER TABLET    Take 1 tablet by mouth every 6 (six) hours as needed for Pain.    LATANOPROST 0.005 % OPHTHALMIC SOLUTION        LISINOPRIL-HYDROCHLOROTHIAZIDE (PRINZIDE,ZESTORETIC) 20-25 MG TAB        MONTELUKAST (SINGULAIR) 10 MG TABLET        MYRBETRIQ 50 MG TB24    Take 1 tablet (50 mg total) by mouth once daily.    POTASSIUM CHLORIDE SA (K-DUR,KLOR-CON) 10 MEQ TABLET        ROSUVASTATIN (CRESTOR) 10 MG TABLET        SYNTHROID 88 MCG TABLET    Take 88 mcg by mouth once daily.        Past Social History:   Social History     Socioeconomic History    Marital status:    Tobacco Use    Smoking status: Never    Smokeless tobacco: Never   Substance and Sexual Activity    Alcohol use: Never    Drug use: Never       Allergies:   Review of patient's allergies indicates:   Allergen Reactions    Codeine Rash        Family History:   Family History   Problem Relation Age of Onset    Hypertension Mother     Hypertension Father         Review of Systems:  Review of Systems   Constitutional:  Negative for activity change and appetite change.   HENT:  Negative for congestion and dental problem.    Respiratory:  Negative for chest tightness and shortness of breath.    Cardiovascular:  Negative for chest pain.   Gastrointestinal:  Negative for abdominal distention and abdominal pain.   Genitourinary:  Positive for urgency. Negative for decreased urine volume, difficulty urinating, dyspareunia, dysuria, enuresis, flank pain, frequency, genital sores, hematuria and pelvic pain.   Musculoskeletal:  Negative for back pain and neck pain.   Allergic/Immunologic: Negative for immunocompromised state.   Neurological:  Negative for dizziness.   Hematological:  Negative for adenopathy.   Psychiatric/Behavioral:  Negative for agitation, behavioral problems and confusion.      Physical Exam:  Physical Exam  Vitals and nursing note reviewed.   Constitutional:       Appearance: She is well-developed.   HENT:      Head:  Normocephalic.   Eyes:      Pupils: Pupils are equal, round, and reactive to light.   Cardiovascular:      Rate and Rhythm: Normal rate and regular rhythm.      Heart sounds: Normal heart sounds.   Pulmonary:      Effort: Pulmonary effort is normal.      Breath sounds: Normal breath sounds.   Abdominal:      General: Bowel sounds are normal.      Palpations: Abdomen is soft.   Musculoskeletal:         General: Normal range of motion.      Cervical back: Normal range of motion and neck supple.   Skin:     General: Skin is warm and dry.   Neurological:      Mental Status: She is alert and oriented to person, place, and time.   Psychiatric:         Mood and Affect: Mood normal.         Behavior: Behavior normal.       Assessment/Plan:   Urge incontinence/urinary urgency:  Patient had Botox in September with improvement.    She is complaining of recurrence of symptoms.    She is just shy of 6 months.  We will arrange for repeat Botox at the 6 month roselyn.  She had 100 units at last time.      Follow-up to be arranged.  Problem List Items Addressed This Visit    None  Visit Diagnoses       Urge incontinence    -  Primary    Urinary urgency        Relevant Orders    POCT Bladder Scan

## 2023-02-22 ENCOUNTER — TELEPHONE (OUTPATIENT)
Dept: UROLOGY | Facility: CLINIC | Age: 88
End: 2023-02-22
Payer: MEDICARE

## 2023-02-22 NOTE — TELEPHONE ENCOUNTER
----- Message from Tonie Erickson sent at 2/22/2023 11:50 AM CST -----  Contact: Sadaf Talavera needs a call back at [phone], Regards to her nurse visit.    Thanks  Td

## 2023-02-22 NOTE — TELEPHONE ENCOUNTER
Pt needs sx clearance but her appointment with doctor for that is 3-1-23 at 1:00PM so she needed to change consent/education appointment. Done.

## 2023-03-01 DIAGNOSIS — N39.41 URGE INCONTINENCE: Primary | ICD-10-CM

## 2023-03-03 ENCOUNTER — CLINICAL SUPPORT (OUTPATIENT)
Dept: UROLOGY | Facility: CLINIC | Age: 88
End: 2023-03-03
Payer: MEDICARE

## 2023-03-03 NOTE — PROGRESS NOTES
Consented pt and went over instruction prior to procedure and after procedure. Pt verbalized understanding. ED

## 2023-03-06 LAB
ANION GAP SERPL CALC-SCNC: 6 MMOL/L (ref 3–11)
APPEARANCE, UA: CLEAR
BACTERIA SPEC CULT: ABNORMAL /HPF
BASOPHILS NFR BLD: 1.2 % (ref 0–3)
BILIRUB UR QL STRIP: NEGATIVE MG/DL
BUN SERPL-MCNC: 18 MG/DL (ref 7–18)
BUN/CREAT SERPL: 22.78 RATIO (ref 7–18)
CALCIUM SERPL-MCNC: 9.1 MG/DL (ref 8.8–10.5)
CHLORIDE SERPL-SCNC: 102 MMOL/L (ref 100–108)
CO2 SERPL-SCNC: 32 MMOL/L (ref 21–32)
COLOR UR: ABNORMAL
CREAT SERPL-MCNC: 0.79 MG/DL (ref 0.55–1.02)
EOSINOPHIL NFR BLD: 4.3 % (ref 1–3)
ERYTHROCYTE [DISTWIDTH] IN BLOOD BY AUTOMATED COUNT: 13.8 % (ref 12.5–18)
GFR ESTIMATION: > 60
GLUCOSE (UA): NORMAL MG/DL
GLUCOSE SERPL-MCNC: 93 MG/DL (ref 70–110)
HCT VFR BLD AUTO: 37.6 % (ref 37–47)
HGB BLD-MCNC: 12.2 G/DL (ref 12–16)
HGB UR QL STRIP: NEGATIVE /UL
KETONES UR QL STRIP: NEGATIVE MG/DL
LEUKOCYTE ESTERASE UR QL STRIP: 25 /UL
LYMPHOCYTES NFR BLD: 33.5 % (ref 25–40)
MCH RBC QN AUTO: 28.3 PG (ref 27–31.2)
MCHC RBC AUTO-ENTMCNC: 32.4 G/DL (ref 31.8–35.4)
MCV RBC AUTO: 87.2 FL (ref 80–97)
MONOCYTES NFR BLD: 7.2 % (ref 1–15)
NEUTROPHILS # BLD AUTO: 2.24 10*3/UL (ref 1.8–7.7)
NEUTROPHILS NFR BLD: 53.6 % (ref 37–80)
NITRITE UR QL STRIP: NEGATIVE
NUCLEATED RED BLOOD CELLS: 0 %
PH UR STRIP: 7 PH (ref 5–9)
PLATELETS: 174 10*3/UL (ref 142–424)
POTASSIUM SERPL-SCNC: 3.6 MMOL/L (ref 3.6–5.2)
PROT UR QL STRIP: NEGATIVE MG/DL
RBC # BLD AUTO: 4.31 10*6/UL (ref 4.2–5.4)
RBC #/AREA URNS HPF: ABNORMAL /HPF (ref 0–2)
SERVICE COMMENT 03: ABNORMAL
SODIUM BLD-SCNC: 140 MMOL/L (ref 135–145)
SP GR UR STRIP: 1.01 (ref 1–1.03)
SPECIMEN COLLECTION METHOD, URINE: ABNORMAL
SQUAMOUS EPITHELIAL, UA: ABNORMAL /LPF
UROBILINOGEN UR STRIP-ACNC: NORMAL MG/DL
WBC # BLD: 4.2 10*3/UL (ref 4.6–10.2)
WBC #/AREA URNS HPF: ABNORMAL /HPF (ref 0–5)

## 2023-03-10 ENCOUNTER — OUTSIDE PLACE OF SERVICE (OUTPATIENT)
Dept: UROLOGY | Facility: CLINIC | Age: 88
End: 2023-03-10

## 2023-03-10 PROCEDURE — 52287 CYSTOSCOPY CHEMODENERVATION: CPT | Mod: ,,, | Performed by: UROLOGY

## 2023-03-10 PROCEDURE — 52287 PR CYSTOURETHROSCOPY WITH INJ FOR CHEMODENERVATION: ICD-10-PCS | Mod: ,,, | Performed by: UROLOGY

## 2023-03-10 RX ORDER — CIPROFLOXACIN 500 MG/1
500 TABLET ORAL 2 TIMES DAILY
Qty: 6 TABLET | Refills: 0 | Status: SHIPPED | OUTPATIENT
Start: 2023-03-10 | End: 2023-03-12

## 2023-03-10 RX ORDER — HYDROCODONE BITARTRATE AND ACETAMINOPHEN 10; 325 MG/1; MG/1
1 TABLET ORAL EVERY 6 HOURS PRN
Qty: 10 TABLET | Refills: 0 | Status: CANCELLED | OUTPATIENT
Start: 2023-03-10

## 2023-03-10 RX ORDER — HYDROCODONE BITARTRATE AND ACETAMINOPHEN 10; 325 MG/1; MG/1
1 TABLET ORAL EVERY 6 HOURS PRN
Qty: 10 TABLET | Refills: 0 | Status: SHIPPED | OUTPATIENT
Start: 2023-03-10 | End: 2023-12-06

## 2023-04-04 ENCOUNTER — OFFICE VISIT (OUTPATIENT)
Dept: UROLOGY | Facility: CLINIC | Age: 88
End: 2023-04-04
Payer: MEDICARE

## 2023-04-04 DIAGNOSIS — R39.15 URINARY URGENCY: Primary | ICD-10-CM

## 2023-04-04 PROCEDURE — 1159F MED LIST DOCD IN RCRD: CPT | Mod: CPTII,S$GLB,, | Performed by: UROLOGY

## 2023-04-04 PROCEDURE — 1159F PR MEDICATION LIST DOCUMENTED IN MEDICAL RECORD: ICD-10-PCS | Mod: CPTII,S$GLB,, | Performed by: UROLOGY

## 2023-04-04 PROCEDURE — 99213 PR OFFICE/OUTPT VISIT, EST, LEVL III, 20-29 MIN: ICD-10-PCS | Mod: S$GLB,,, | Performed by: UROLOGY

## 2023-04-04 PROCEDURE — 1160F PR REVIEW ALL MEDS BY PRESCRIBER/CLIN PHARMACIST DOCUMENTED: ICD-10-PCS | Mod: CPTII,S$GLB,, | Performed by: UROLOGY

## 2023-04-04 PROCEDURE — 1160F RVW MEDS BY RX/DR IN RCRD: CPT | Mod: CPTII,S$GLB,, | Performed by: UROLOGY

## 2023-04-04 PROCEDURE — 99213 OFFICE O/P EST LOW 20 MIN: CPT | Mod: S$GLB,,, | Performed by: UROLOGY

## 2023-04-04 NOTE — PROGRESS NOTES
Subjective:       Patient ID: Luna Miles is a 87 y.o. female.    Chief Complaint: botox 100 units F/U      HPI:  87-year-old female status post 100 units of Botox patient feels well has no issues she states she has significant improvement no longer wear pads during the day only at night    Past Medical History:   Past Medical History:   Diagnosis Date    HTN (hypertension)     Hyperlipidemia     Hyperthyroidism     IBS (irritable bowel syndrome)     Incontinence of urine     OA (osteoarthritis)     Peripheral neuropathy        Past Surgical Historical:   Past Surgical History:   Procedure Laterality Date    CARPAL TUNNEL RELEASE Right     FORAMINOTOMY  02/03/2020    L3-L5 MIS-[facility]    HEMILAMINECTOMY  02/03/2020    L3-L5 MIS -[facility]    HYSTERECTOMY      HYSTERECTOMY      KNEE SURGERY Bilateral     [facility]- [facility]     ROBOT-ASSISTED REPAIR OF BLADDER      SHOULDER SURGERY Left 12/09/2020    Reverse shoulder [facility]    SHOULDER SURGERY Right     RTC Repair-[facility]    SHOULDER SURGERY Left 12/09/2020    [facility] / [facility]    TONSILLECTOMY      VAGINAL PROLAPSE REPAIR      Patient had a vaginal vault prolapse repair via a SSL suspension in 2015        Medications:   Medication List with Changes/Refills   Current Medications    ASPIRIN (ECOTRIN) 81 MG EC TABLET    Take 81 mg by mouth once daily.    BISOPROLOL (ZEBETA) 5 MG TABLET        DENTA 5000 PLUS 1.1 % CREA    USE AS DIRECTED TWICE DAILY    FUROSEMIDE (LASIX) 20 MG TABLET        HYDROCODONE-ACETAMINOPHEN (NORCO)  MG PER TABLET    Take 1 tablet by mouth every 6 (six) hours as needed for Pain.    LATANOPROST 0.005 % OPHTHALMIC SOLUTION        LISINOPRIL-HYDROCHLOROTHIAZIDE (PRINZIDE,ZESTORETIC) 20-25 MG TAB        MONTELUKAST (SINGULAIR) 10 MG TABLET        MYRBETRIQ 50 MG TB24    Take 1 tablet (50 mg total) by mouth once daily.    POTASSIUM CHLORIDE SA (K-DUR,KLOR-CON) 10 MEQ TABLET        ROSUVASTATIN (CRESTOR) 10 MG TABLET        SYNTHROID 88 MCG TABLET     Take 88 mcg by mouth once daily.        Past Social History:   Social History     Socioeconomic History    Marital status:    Tobacco Use    Smoking status: Never    Smokeless tobacco: Never   Substance and Sexual Activity    Alcohol use: Never    Drug use: Never       Allergies:   Review of patient's allergies indicates:   Allergen Reactions    Codeine Rash        Family History:   Family History   Problem Relation Age of Onset    Hypertension Mother     Hypertension Father         Review of Systems:  Review of Systems   Constitutional:  Negative for activity change and appetite change.   HENT:  Negative for congestion and dental problem.    Eyes:  Negative for pain and discharge.   Respiratory:  Negative for chest tightness and shortness of breath.    Cardiovascular:  Negative for chest pain.   Gastrointestinal:  Negative for abdominal distention and abdominal pain.   Endocrine: Negative for cold intolerance, heat intolerance and polyuria.   Genitourinary:  Negative for decreased urine volume, difficulty urinating, dyspareunia, dysuria, enuresis, flank pain, frequency, genital sores, hematuria, menstrual problem, pelvic pain, urgency, vaginal bleeding, vaginal discharge and vaginal pain.   Musculoskeletal:  Negative for back pain and neck pain.   Skin:  Negative for color change and rash.   Allergic/Immunologic: Negative for immunocompromised state.   Neurological:  Negative for dizziness.   Hematological:  Negative for adenopathy.   Psychiatric/Behavioral:  Negative for agitation, behavioral problems and confusion.      Physical Exam:  Physical Exam  Constitutional:       Appearance: She is well-developed.   HENT:      Head: Normocephalic and atraumatic.      Right Ear: External ear normal.      Left Ear: External ear normal.   Eyes:      Conjunctiva/sclera: Conjunctivae normal.   Neck:      Vascular: No JVD.   Cardiovascular:      Rate and Rhythm: Normal rate and regular rhythm.   Pulmonary:      Effort:  Pulmonary effort is normal. No respiratory distress.      Breath sounds: Normal breath sounds. No wheezing.   Abdominal:      General: There is no distension.      Palpations: Abdomen is soft.      Tenderness: There is no abdominal tenderness. There is no rebound.   Musculoskeletal:         General: Normal range of motion.      Cervical back: Normal range of motion.   Skin:     General: Skin is warm and dry.      Findings: No erythema or rash.   Neurological:      Mental Status: She is alert and oriented to person, place, and time.   Psychiatric:         Behavior: Behavior normal.       Assessment/Plan:       Problem List Items Addressed This Visit    None         Successful Botox placement return to clinic when

## 2023-05-23 ENCOUNTER — PATIENT MESSAGE (OUTPATIENT)
Dept: RESEARCH | Facility: HOSPITAL | Age: 88
End: 2023-05-23
Payer: MEDICARE

## 2023-11-27 ENCOUNTER — TELEPHONE (OUTPATIENT)
Dept: UROLOGY | Facility: CLINIC | Age: 88
End: 2023-11-27
Payer: MEDICARE

## 2023-11-27 NOTE — TELEPHONE ENCOUNTER
Attempted to return call. I see pt already has an joshua with a provider.     ----- Message from Griselda Fischer sent at 11/27/2023  2:34 PM CST -----  Contact: self  Type: Staff Message    Caller: Luna Miles  Call Back Number: [phone]  Nature of the Call: pt is needing an apt for botox  Additional Information: na

## 2023-11-29 ENCOUNTER — OFFICE VISIT (OUTPATIENT)
Dept: UROLOGY | Facility: CLINIC | Age: 88
End: 2023-11-29
Payer: MEDICARE

## 2023-11-29 VITALS — BODY MASS INDEX: 32.1 KG/M2 | WEIGHT: 170 LBS | HEIGHT: 61 IN

## 2023-11-29 DIAGNOSIS — R39.15 URINARY URGENCY: ICD-10-CM

## 2023-11-29 DIAGNOSIS — N39.41 URGE INCONTINENCE: Primary | ICD-10-CM

## 2023-11-29 PROCEDURE — 51798 US URINE CAPACITY MEASURE: CPT | Mod: S$GLB,,, | Performed by: NURSE PRACTITIONER

## 2023-11-29 PROCEDURE — 51798 POCT BLADDER SCAN: ICD-10-PCS | Mod: S$GLB,,, | Performed by: NURSE PRACTITIONER

## 2023-11-29 PROCEDURE — 1159F PR MEDICATION LIST DOCUMENTED IN MEDICAL RECORD: ICD-10-PCS | Mod: CPTII,S$GLB,, | Performed by: NURSE PRACTITIONER

## 2023-11-29 PROCEDURE — 99214 OFFICE O/P EST MOD 30 MIN: CPT | Mod: S$GLB,,, | Performed by: NURSE PRACTITIONER

## 2023-11-29 PROCEDURE — 1159F MED LIST DOCD IN RCRD: CPT | Mod: CPTII,S$GLB,, | Performed by: NURSE PRACTITIONER

## 2023-11-29 PROCEDURE — 1160F RVW MEDS BY RX/DR IN RCRD: CPT | Mod: CPTII,S$GLB,, | Performed by: NURSE PRACTITIONER

## 2023-11-29 PROCEDURE — 1160F PR REVIEW ALL MEDS BY PRESCRIBER/CLIN PHARMACIST DOCUMENTED: ICD-10-PCS | Mod: CPTII,S$GLB,, | Performed by: NURSE PRACTITIONER

## 2023-11-29 PROCEDURE — 99214 PR OFFICE/OUTPT VISIT, EST, LEVL IV, 30-39 MIN: ICD-10-PCS | Mod: S$GLB,,, | Performed by: NURSE PRACTITIONER

## 2023-11-29 NOTE — PROGRESS NOTES
Subjective:       Patient ID: Luna Miles is a 88 y.o. female.    Chief Complaint: No chief complaint on file.      HPI: 88-year-old female, established patient, last seen 6 months ago.    Patient has history of urge incontinence with urinary urgency.    She has failed multiple oral medications.    Patient has been having success with Botox.  Her last Botox injection was in April 2023.  She also had Botox injection in September 2022.    Patient states that her symptoms have returned.  She is complaining of episodes of urgency with leakage.  She would like to proceed with repeat Botox.      No other urinary complaints at this time.         Past Medical History:   Past Medical History:   Diagnosis Date    HTN (hypertension)     Hyperlipidemia     Hyperthyroidism     IBS (irritable bowel syndrome)     Incontinence of urine     OA (osteoarthritis)     Peripheral neuropathy        Past Surgical Historical:   Past Surgical History:   Procedure Laterality Date    CARPAL TUNNEL RELEASE Right     FORAMINOTOMY  02/03/2020    L3-L5 MIS-[facility]    HEMILAMINECTOMY  02/03/2020    L3-L5 MIS -[facility]    HYSTERECTOMY      HYSTERECTOMY      KNEE SURGERY Bilateral     TKA- [facility]     ROBOT-ASSISTED REPAIR OF BLADDER      SHOULDER SURGERY Left 12/09/2020    Reverse shoulder [facility]    SHOULDER SURGERY Right     RTC Repair-[facility]    SHOULDER SURGERY Left 12/09/2020    [facility] / [facility]    TONSILLECTOMY      VAGINAL PROLAPSE REPAIR      Patient had a vaginal vault prolapse repair via a SSL suspension in 2015        Medications:   Medication List with Changes/Refills   Current Medications    ASPIRIN (ECOTRIN) 81 MG EC TABLET    Take 81 mg by mouth once daily.    BISOPROLOL (ZEBETA) 5 MG TABLET        DENTA 5000 PLUS 1.1 % CREA    USE AS DIRECTED TWICE DAILY    FUROSEMIDE (LASIX) 20 MG TABLET        HYDROCODONE-ACETAMINOPHEN (NORCO)  MG PER TABLET    Take 1 tablet by mouth every 6 (six) hours as needed for Pain.    LATANOPROST  0.005 % OPHTHALMIC SOLUTION        LISINOPRIL-HYDROCHLOROTHIAZIDE (PRINZIDE,ZESTORETIC) 20-25 MG TAB        MONTELUKAST (SINGULAIR) 10 MG TABLET        MYRBETRIQ 50 MG TB24    Take 1 tablet (50 mg total) by mouth once daily.    POTASSIUM CHLORIDE SA (K-DUR,KLOR-CON) 10 MEQ TABLET        ROSUVASTATIN (CRESTOR) 10 MG TABLET        SYNTHROID 88 MCG TABLET    Take 88 mcg by mouth once daily.        Past Social History:   Social History     Socioeconomic History    Marital status:    Tobacco Use    Smoking status: Never    Smokeless tobacco: Never   Substance and Sexual Activity    Alcohol use: Never    Drug use: Never       Allergies:   Review of patient's allergies indicates:   Allergen Reactions    Codeine Rash        Family History:   Family History   Problem Relation Age of Onset    Hypertension Mother     Hypertension Father         Review of Systems:  Review of Systems   Constitutional:  Negative for activity change and appetite change.   HENT:  Negative for congestion and dental problem.    Respiratory:  Negative for chest tightness and shortness of breath.    Cardiovascular:  Negative for chest pain.   Gastrointestinal:  Negative for abdominal distention and abdominal pain.   Genitourinary:  Positive for urgency. Negative for decreased urine volume, difficulty urinating, dyspareunia, dysuria, enuresis, flank pain, frequency, genital sores, hematuria and pelvic pain.   Musculoskeletal:  Negative for back pain and neck pain.   Allergic/Immunologic: Negative for immunocompromised state.   Neurological:  Negative for dizziness.   Hematological:  Negative for adenopathy.   Psychiatric/Behavioral:  Negative for agitation, behavioral problems and confusion.        Physical Exam:  Physical Exam  Vitals and nursing note reviewed.   Constitutional:       Appearance: She is well-developed.   HENT:      Head: Normocephalic.   Eyes:      Pupils: Pupils are equal, round, and reactive to light.   Cardiovascular:       Rate and Rhythm: Normal rate and regular rhythm.      Heart sounds: Normal heart sounds.   Pulmonary:      Effort: Pulmonary effort is normal.      Breath sounds: Normal breath sounds.   Abdominal:      General: Bowel sounds are normal.      Palpations: Abdomen is soft.   Musculoskeletal:         General: Normal range of motion.      Cervical back: Normal range of motion and neck supple.   Skin:     General: Skin is warm and dry.   Neurological:      Mental Status: She is alert and oriented to person, place, and time.   Psychiatric:         Mood and Affect: Mood normal.         Behavior: Behavior normal.       Bladder scan:  0 cc    Assessment/Plan:     Urge incontinence/urinary urgency:  Patient has had success with Botox injections.  Last Botox injection was in April 2023.    Patient like to proceed with repeat Botox.    Will arrange Botox injections with Dr. Aguila, 100 units.      Follow-up to be arranged.  Problem List Items Addressed This Visit    None  Visit Diagnoses       Urge incontinence    -  Primary    Relevant Orders    POCT Bladder Scan    Urinary urgency        Relevant Orders    POCT Bladder Scan

## 2023-12-06 ENCOUNTER — TELEPHONE (OUTPATIENT)
Dept: UROLOGY | Facility: CLINIC | Age: 88
End: 2023-12-06

## 2023-12-06 ENCOUNTER — CLINICAL SUPPORT (OUTPATIENT)
Dept: UROLOGY | Facility: CLINIC | Age: 88
End: 2023-12-06
Payer: MEDICARE

## 2023-12-06 DIAGNOSIS — N39.41 URGE INCONTINENCE: Primary | ICD-10-CM

## 2023-12-06 LAB
ANION GAP SERPL CALC-SCNC: 8 MMOL/L (ref 3–11)
APPEARANCE, UA: CLEAR
BACTERIA SPEC CULT: ABNORMAL /HPF
BASOPHILS NFR BLD: 1.2 % (ref 0–3)
BILIRUB UR QL STRIP: NEGATIVE MG/DL
BUN SERPL-MCNC: 30 MG/DL (ref 7–18)
BUN/CREAT SERPL: 27.77 RATIO (ref 7–18)
CALCIUM SERPL-MCNC: 8.9 MG/DL (ref 8.8–10.5)
CHLORIDE SERPL-SCNC: 103 MMOL/L (ref 100–108)
CO2 SERPL-SCNC: 28 MMOL/L (ref 21–32)
COLOR UR: ABNORMAL
CREAT SERPL-MCNC: 1.08 MG/DL (ref 0.55–1.02)
EOSINOPHIL NFR BLD: 5.1 % (ref 1–3)
ERYTHROCYTE [DISTWIDTH] IN BLOOD BY AUTOMATED COUNT: 13.7 % (ref 12.5–18)
GFR ESTIMATION: 48
GLUCOSE (UA): NORMAL MG/DL
GLUCOSE SERPL-MCNC: 107 MG/DL (ref 70–110)
HCT VFR BLD AUTO: 35.8 % (ref 37–47)
HGB BLD-MCNC: 11.9 G/DL (ref 12–16)
HGB UR QL STRIP: NEGATIVE /UL
KETONES UR QL STRIP: NEGATIVE MG/DL
LEUKOCYTE ESTERASE UR QL STRIP: 25 /UL
LYMPHOCYTES NFR BLD: 24.5 % (ref 25–40)
MCH RBC QN AUTO: 28.1 PG (ref 27–31.2)
MCHC RBC AUTO-ENTMCNC: 33.2 G/DL (ref 31.8–35.4)
MCV RBC AUTO: 84.6 FL (ref 80–97)
MONOCYTES NFR BLD: 8.1 % (ref 1–15)
NEUTROPHILS # BLD AUTO: 3.09 10*3/UL (ref 1.8–7.7)
NEUTROPHILS NFR BLD: 60.9 % (ref 37–80)
NITRITE UR QL STRIP: NEGATIVE
NUCLEATED RED BLOOD CELLS: 0 %
PH UR STRIP: 6 PH (ref 5–9)
PLATELETS: 184 10*3/UL (ref 142–424)
POTASSIUM SERPL-SCNC: 3.6 MMOL/L (ref 3.6–5.2)
PROT UR QL STRIP: ABNORMAL MG/DL
RBC # BLD AUTO: 4.23 10*6/UL (ref 4.2–5.4)
RBC #/AREA URNS HPF: ABNORMAL /HPF (ref 0–2)
SERVICE COMMENT 03: ABNORMAL
SODIUM BLD-SCNC: 139 MMOL/L (ref 135–145)
SP GR UR STRIP: 1.02 (ref 1–1.03)
SPECIMEN COLLECTION METHOD, URINE: ABNORMAL
SQUAMOUS EPITHELIAL, UA: ABNORMAL /LPF
UROBILINOGEN UR STRIP-ACNC: NORMAL MG/DL
WBC # BLD: 5.1 10*3/UL (ref 4.6–10.2)
WBC #/AREA URNS HPF: ABNORMAL /HPF (ref 0–5)

## 2023-12-06 RX ORDER — AMLODIPINE BESYLATE 2.5 MG/1
5 TABLET ORAL DAILY
COMMUNITY
Start: 2023-10-28

## 2023-12-06 RX ORDER — POTASSIUM CHLORIDE 750 MG/1
10 TABLET, EXTENDED RELEASE ORAL DAILY
COMMUNITY
Start: 2023-10-07

## 2023-12-06 RX ORDER — KETOCONAZOLE 20 MG/ML
2 SHAMPOO, SUSPENSION TOPICAL WEEKLY
COMMUNITY
Start: 2023-09-16

## 2023-12-06 NOTE — PROGRESS NOTES
Here for Botox education.  Pharmacy and medications verified and reviewed.  Pre-op instruction discussed and questions answered.  Consents signed.  Encouraged to call with any questions or concerns

## 2023-12-06 NOTE — TELEPHONE ENCOUNTER
----- Message from Katie Pulliam NP sent at 12/6/2023  2:08 PM CST -----  Patient can follow up with PCP for cardiomegaly.

## 2023-12-13 ENCOUNTER — OUTSIDE PLACE OF SERVICE (OUTPATIENT)
Dept: UROLOGY | Facility: CLINIC | Age: 88
End: 2023-12-13

## 2023-12-13 DIAGNOSIS — R39.15 URINARY URGENCY: Primary | ICD-10-CM

## 2023-12-13 PROCEDURE — 52287 PR CYSTOURETHROSCOPY WITH INJ FOR CHEMODENERVATION: ICD-10-PCS | Mod: ,,, | Performed by: UROLOGY

## 2023-12-13 PROCEDURE — 52287 CYSTOSCOPY CHEMODENERVATION: CPT | Mod: ,,, | Performed by: UROLOGY

## 2023-12-13 RX ORDER — HYDROCODONE BITARTRATE AND ACETAMINOPHEN 7.5; 325 MG/1; MG/1
1 TABLET ORAL EVERY 6 HOURS PRN
Qty: 15 TABLET | Refills: 0 | Status: SHIPPED | OUTPATIENT
Start: 2023-12-13

## 2023-12-13 RX ORDER — CIPROFLOXACIN 500 MG/1
500 TABLET ORAL EVERY 12 HOURS
Qty: 6 TABLET | Refills: 0 | Status: SHIPPED | OUTPATIENT
Start: 2023-12-13 | End: 2023-12-16

## 2024-01-08 ENCOUNTER — OFFICE VISIT (OUTPATIENT)
Dept: UROLOGY | Facility: CLINIC | Age: 89
End: 2024-01-08
Payer: MEDICARE

## 2024-01-08 VITALS
BODY MASS INDEX: 32.1 KG/M2 | HEIGHT: 61 IN | HEART RATE: 65 BPM | DIASTOLIC BLOOD PRESSURE: 65 MMHG | WEIGHT: 170 LBS | SYSTOLIC BLOOD PRESSURE: 138 MMHG

## 2024-01-08 DIAGNOSIS — N39.41 URGE INCONTINENCE: Primary | ICD-10-CM

## 2024-01-08 LAB
POC RESIDUAL URINE VOLUME: 305 ML (ref 0–100)
POC RESIDUAL URINE VOLUME: NORMAL (ref 0–100)

## 2024-01-08 PROCEDURE — 99024 POSTOP FOLLOW-UP VISIT: CPT | Mod: S$GLB,,, | Performed by: FAMILY MEDICINE

## 2024-01-08 PROCEDURE — 1159F MED LIST DOCD IN RCRD: CPT | Mod: CPTII,S$GLB,, | Performed by: FAMILY MEDICINE

## 2024-01-08 PROCEDURE — 51798 US URINE CAPACITY MEASURE: CPT | Mod: S$GLB,,, | Performed by: FAMILY MEDICINE

## 2024-01-08 NOTE — PROGRESS NOTES
Subjective:       Patient ID: Luna Miles is a 88 y.o. female.    Chief Complaint: No chief complaint on file.      HPI: 88-year-old female patient presenting to the clinic to follow up.  Patient received 100 units of Botox on 12/13/2023.  She has had several rounds of Botox in the past.  She was doing well and happy with the improvement in her symptoms.  She denies any symptoms of infection at this time         Past Medical History:   Past Medical History:   Diagnosis Date    HTN (hypertension)     Hyperlipidemia     Hyperthyroidism     IBS (irritable bowel syndrome)     Incontinence of urine     OA (osteoarthritis)     Peripheral neuropathy        Past Surgical Historical:   Past Surgical History:   Procedure Laterality Date    CARPAL TUNNEL RELEASE Right     FORAMINOTOMY  02/03/2020    L3-L5 [facility]IS-[facility]    HEMILAMINECTOMY  02/03/2020    L3-L5 MIS -[facility]    HYSTERECTOMY      HYSTERECTOMY      KNEE SURGERY Bilateral     TKA- [facility]     ROBOT-ASSISTED REPAIR OF BLADDER      SHOULDER SURGERY Left 12/09/2020    Reverse shoulder [facility]    SHOULDER SURGERY Right     RTC Repair-[facility]    SHOULDER SURGERY Left 12/09/2020    [facility] / [facility]    TONSILLECTOMY      VAGINAL PROLAPSE REPAIR      Patient had a vaginal vault prolapse repair via a SSL suspension in 2015        Medications:   Medication List with Changes/Refills   Current Medications    AMLODIPINE (NORVASC) 2.5 MG TABLET    Take 5 mg by mouth once daily.    ASPIRIN (ECOTRIN) 81 MG EC TABLET    Take 81 mg by mouth once daily.    BISOPROLOL (ZEBETA) 5 MG TABLET        DENTA 5000 PLUS 1.1 % CREA    USE AS DIRECTED TWICE DAILY    FUROSEMIDE (LASIX) 20 MG TABLET        HYDROCODONE-ACETAMINOPHEN (NORCO) 7.5-325 MG PER TABLET    Take 1 tablet by mouth every 6 (six) hours as needed for Pain.    KETOCONAZOLE (NIZORAL) 2 % SHAMPOO    Apply 2 % topically once a week.    LATANOPROST 0.005 % OPHTHALMIC SOLUTION        LISINOPRIL-HYDROCHLOROTHIAZIDE  (PRINZIDE,ZESTORETIC) 20-25 MG TAB        POTASSIUM CHLORIDE (KLOR-CON) 10 MEQ TBSR    Take 10 mEq by mouth once daily.    ROSUVASTATIN (CRESTOR) 10 MG TABLET        SYNTHROID 88 MCG TABLET    Take 88 mcg by mouth once daily.        Past Social History:   Social History     Socioeconomic History    Marital status:    Tobacco Use    Smoking status: Never    Smokeless tobacco: Never   Substance and Sexual Activity    Alcohol use: Never    Drug use: Never       Allergies:   Review of patient's allergies indicates:   Allergen Reactions    Codeine Rash        Family History:   Family History   Problem Relation Age of Onset    Hypertension Mother     Hypertension Father         Review of Systems:  Review of Systems   Constitutional:  Negative for activity change, appetite change, chills, diaphoresis, fatigue, fever and unexpected weight change.   HENT:  Negative for congestion, dental problem, drooling, ear discharge, ear pain, facial swelling, hearing loss, mouth sores, nosebleeds, postnasal drip, rhinorrhea, sinus pressure, sinus pain, sneezing, sore throat, tinnitus, trouble swallowing and voice change.    Eyes:  Negative for photophobia, pain, discharge, redness, itching and visual disturbance.   Respiratory:  Negative for apnea, cough, choking, chest tightness, shortness of breath, wheezing and stridor.    Cardiovascular:  Negative for chest pain and leg swelling.   Gastrointestinal:  Negative for abdominal distention, abdominal pain, anal bleeding, blood in stool, constipation, diarrhea, nausea, rectal pain and vomiting.   Endocrine: Negative for cold intolerance, heat intolerance, polydipsia, polyphagia and polyuria.   Genitourinary: Negative.  Negative for decreased urine volume, difficulty urinating, dyspareunia, dysuria, enuresis, flank pain, frequency, genital sores, hematuria, menstrual problem, pelvic pain, urgency, vaginal bleeding, vaginal discharge and vaginal pain.   Musculoskeletal:  Negative for  arthralgias, back pain, gait problem, joint swelling, myalgias, neck pain and neck stiffness.   Skin:  Negative for color change, pallor, rash and wound.   Allergic/Immunologic: Negative for environmental allergies, food allergies and immunocompromised state.   Neurological:  Negative for dizziness, tremors, seizures, syncope, facial asymmetry, speech difficulty, weakness, light-headedness, numbness and headaches.   Hematological:  Negative for adenopathy. Does not bruise/bleed easily.   Psychiatric/Behavioral:  Negative for agitation, behavioral problems, confusion, decreased concentration, dysphoric mood, hallucinations, self-injury, sleep disturbance and suicidal ideas. The patient is not nervous/anxious and is not hyperactive.        Physical Exam:  Physical Exam  Exam conducted with a chaperone present.   Constitutional:       Appearance: Normal appearance.   HENT:      Head: Normocephalic.      Nose: Nose normal.      Mouth/Throat:      Mouth: Mucous membranes are moist.      Pharynx: Oropharynx is clear.   Eyes:      Extraocular Movements: Extraocular movements intact.      Conjunctiva/sclera: Conjunctivae normal.      Pupils: Pupils are equal, round, and reactive to light.   Cardiovascular:      Rate and Rhythm: Normal rate and regular rhythm.      Pulses: Normal pulses.      Heart sounds: Normal heart sounds.   Pulmonary:      Effort: Pulmonary effort is normal.      Breath sounds: Normal breath sounds.   Abdominal:      General: Abdomen is flat. Bowel sounds are normal.      Palpations: Abdomen is soft.      Hernia: There is no hernia in the left inguinal area or right inguinal area.   Genitourinary:     General: Normal vulva.      Pubic Area: No rash or pubic lice.       Labia:         Right: No rash, tenderness, lesion or injury.         Left: No rash, tenderness, lesion or injury.       Urethra: No prolapse, urethral pain, urethral swelling or urethral lesion.      Rectum: Normal.   Musculoskeletal:          General: Normal range of motion.      Cervical back: Normal range of motion and neck supple.   Lymphadenopathy:      Lower Body: No right inguinal adenopathy. No left inguinal adenopathy.   Skin:     General: Skin is warm and dry.      Capillary Refill: Capillary refill takes less than 2 seconds.   Neurological:      General: No focal deficit present.      Mental Status: She is alert and oriented to person, place, and time. Mental status is at baseline.   Psychiatric:         Mood and Affect: Mood normal.         Behavior: Behavior normal.         Thought Content: Thought content normal.         Judgment: Judgment normal.         Assessment/Plan:     Urinary urgency and frequency:  Patient doing well and has no complaints at this time.  Her postvoid residual is a little elevated however she is asymptomatic this time. Instructed the patient to presents to clinic for urine drop-off if symptoms of infection develop.  Otherwise follow up when symptoms of urgency and frequency begin to worsen  Problem List Items Addressed This Visit    None  Visit Diagnoses       Urge incontinence    -  Primary    Relevant Orders    POCT Bladder Scan (Completed)

## 2024-05-31 DIAGNOSIS — J84.10 PULMONARY FIBROSIS: Primary | ICD-10-CM

## 2024-07-29 ENCOUNTER — TELEPHONE (OUTPATIENT)
Dept: PULMONOLOGY | Facility: CLINIC | Age: 89
End: 2024-07-29
Payer: MEDICARE

## 2024-07-31 ENCOUNTER — PATIENT MESSAGE (OUTPATIENT)
Dept: PULMONOLOGY | Facility: CLINIC | Age: 89
End: 2024-07-31

## 2024-07-31 ENCOUNTER — HOSPITAL ENCOUNTER (OUTPATIENT)
Dept: RADIOLOGY | Facility: CLINIC | Age: 89
Discharge: HOME | End: 2024-07-31
Attending: INTERNAL MEDICINE
Payer: MEDICARE

## 2024-07-31 ENCOUNTER — CLINICAL SUPPORT (OUTPATIENT)
Dept: PULMONOLOGY | Facility: CLINIC | Age: 89
End: 2024-07-31
Payer: MEDICARE

## 2024-07-31 ENCOUNTER — OFFICE VISIT (OUTPATIENT)
Dept: PULMONOLOGY | Facility: CLINIC | Age: 89
End: 2024-07-31
Payer: MEDICARE

## 2024-07-31 VITALS
HEART RATE: 59 BPM | DIASTOLIC BLOOD PRESSURE: 76 MMHG | BODY MASS INDEX: 33.04 KG/M2 | SYSTOLIC BLOOD PRESSURE: 157 MMHG | RESPIRATION RATE: 17 BRPM | HEIGHT: 61 IN | WEIGHT: 175 LBS | OXYGEN SATURATION: 95 %

## 2024-07-31 DIAGNOSIS — J84.10 PULMONARY FIBROSIS: ICD-10-CM

## 2024-07-31 DIAGNOSIS — J98.6 ACQUIRED ELEVATED DIAPHRAGM: ICD-10-CM

## 2024-07-31 DIAGNOSIS — J84.9 INTERSTITIAL LUNG DISEASE: Primary | ICD-10-CM

## 2024-07-31 DIAGNOSIS — R06.09 EXERTIONAL DYSPNEA: ICD-10-CM

## 2024-07-31 DIAGNOSIS — J47.9 BRONCHIECTASIS WITHOUT COMPLICATION: ICD-10-CM

## 2024-07-31 DIAGNOSIS — R94.2 DIFFUSION CAPACITY OF LUNG (DL), DECREASED: ICD-10-CM

## 2024-07-31 DIAGNOSIS — J84.9 INTERSTITIAL PULMONARY DISEASE, UNSPECIFIED: ICD-10-CM

## 2024-07-31 DIAGNOSIS — J84.10 PULMONARY FIBROSIS: Primary | ICD-10-CM

## 2024-07-31 DIAGNOSIS — R05.3 CHRONIC COUGH: ICD-10-CM

## 2024-07-31 LAB
CREATINE KINASE, SERUM: 184 U/L (ref 26–192)
CRP QUANTITATIVE: < 0.29 MG/DL (ref 0–0.3)
ERYTHROCYTE [SEDIMENTATION RATE] IN BLOOD: 21 MM/HR (ref 0–20)

## 2024-07-31 PROCEDURE — 71046 X-RAY EXAM CHEST 2 VIEWS: CPT | Mod: 26,,, | Performed by: RADIOLOGY

## 2024-07-31 PROCEDURE — 71046 X-RAY EXAM CHEST 2 VIEWS: CPT | Mod: TC,,, | Performed by: INTERNAL MEDICINE

## 2024-07-31 RX ORDER — ALBUTEROL SULFATE 90 UG/1
2 AEROSOL, METERED RESPIRATORY (INHALATION) EVERY 6 HOURS PRN
Qty: 8.5 G | Refills: 5 | Status: SHIPPED | OUTPATIENT
Start: 2024-07-31

## 2024-07-31 RX ORDER — IPRATROPIUM BROMIDE AND ALBUTEROL SULFATE 2.5; .5 MG/3ML; MG/3ML
3 SOLUTION RESPIRATORY (INHALATION) EVERY 12 HOURS
Qty: 75 ML | Refills: 0 | Status: SHIPPED | OUTPATIENT
Start: 2024-07-31 | End: 2025-07-31

## 2024-07-31 NOTE — PROGRESS NOTES
Subjective:    Patient Identification:   Patient ID: Luna Miles is a 88 y.o. female.    Referring Provider:  Dr. Quinten Sanderson    Chief Complaint:  pulmonary fibrosis eval      History of Present Illness:    Luna Miles is a 88 y.o. female who who has been referred to me for evaluation of bronchiectasis and pulmonary fibrosis.  Patient is a retired .  She has never smoked or has tobacco exposure secondhand.  She has shortness of breath for past several months with a cough on and off for many years without any definitive diagnosis.  She gives a history of pneumonia in the past.  She denies any family history of IPF.  She denies any upper airway, chest or neck surgery.  She denies any right upper quadrant or gallbladder surgeries.    She does have mild symptoms of acid reflux but does not take any medications.  Her cough is more at nighttime but happens during the daytime as well.  Mostly this cough is dry.  Occasionally she has wheezing.  She denies any coughing or choking while eating or drinking or have any issues with dysphagia.    She has never been tested for obstructive sleep apnea.  She sleeps 5-6 hours at nighttime and denies any significant snoring or apneic events.  She denies any small joint stiffening but gives a vague history of joint swelling.  She denies any history of blood clots or miscarriages.  She denies any skin tightening.  No cyanosis or Raynaud's phenomena.    She has occasional leg swelling with dizziness which she attributes to inner ear problems, probably benign positional vertigo.  This has not happened for a while now.      She has been evaluated by Dr. Robbins at [facility] and was told that most of the changes were age related.  She was found to have what she describes mild tightening and leaking of her mitral valve and something with her aortic arch.  She had a TTE and a nuclear stress test.  No further workup or intervention was  recommended.  She has never been diagnosed with asthma.    Her PFTs show mild reduction in diffusion capacity otherwise normal profile.  He has no evidence of restriction or obstruction.  Her chest x-ray today shows mildly increased interstitial markings right more than left basilar with slightly elevated right hemidiaphragm.    I was able to review a CT chest report from imaging center from May 20, 2024 which was reported as bronchiectasis in right more than left lower lobe with mild increased interstitial markings noted within the bilateral lower lobes concerning for chronic interstitial lung disease versus early pulmonary fibrosis.    Review of Systems:  Review of Systems   Constitutional:  Negative for fever, chills, weight loss, weight gain, activity change, appetite change, fatigue, night sweats and weakness.   HENT:  Negative for nosebleeds, postnasal drip, rhinorrhea, sinus pressure, sore throat, trouble swallowing, voice change, congestion, ear pain and hearing loss.    Eyes:  Negative for redness and itching.   Respiratory:  Positive for cough, wheezing and dyspnea on extertion. Negative for hemoptysis, choking, chest tightness, shortness of breath, orthopnea, previous hospitialization due to pulmonary problems, asthma nighttime symptoms, pleurisy, use of rescue inhaler and Paroxysmal Nocturnal Dyspnea.    Cardiovascular:  Positive for leg swelling. Negative for chest pain and palpitations.   Genitourinary:  Negative for difficulty urinating and hematuria.   Endocrine:  Negative for polydipsia, polyphagia, cold intolerance, heat intolerance and polyuria.    Musculoskeletal:  Negative for arthralgias, back pain, gait problem, joint swelling and myalgias.   Skin:  Negative for rash.   Gastrointestinal:  Negative for nausea, vomiting, abdominal pain, abdominal distention and acid reflux.   Neurological:  Positive for dizziness. Negative for syncope, weakness, light-headedness and headaches.   Hematological:   Negative for adenopathy. Does not bruise/bleed easily and no excessive bruising.   Psychiatric/Behavioral:  Negative for confusion and sleep disturbance. The patient is not nervous/anxious.          Allergies: Review of patient's allergies indicates:  No Known Allergies    Medications:      Past Medical History:      Past Medical History:   Diagnosis Date    HTN (hypertension)     Hyperlipidemia     Hyperthyroidism     IBS (irritable bowel syndrome)     Incontinence of urine     OA (osteoarthritis)     Peripheral neuropathy        Family History:      Family History   Problem Relation Name Age of Onset    Hypertension Mother      Hypertension Father          Social History:      Past Surgical History:   Procedure Laterality Date    CARPAL TUNNEL RELEASE Right     FORAMINOTOMY  02/03/2020    L3-L5 MIS-[facility]    HEMILAMINECTOMY  02/03/2020    L3-L5 MIS -[facility]    HYSTERECTOMY      HYSTERECTOMY      KNEE SURGERY Bilateral     TKA- [facility]     ROBOT-ASSISTED REPAIR OF BLADDER      SHOULDER SURGERY Left 12/09/2020    Reverse shoulder [facility]    SHOULDER SURGERY Right     RTC Repair-[facility]    SHOULDER SURGERY Left 12/09/2020    [facility] / [facility]    TONSILLECTOMY      VAGINAL PROLAPSE REPAIR      Patient had a vaginal vault prolapse repair via a SSL suspension in 2015       Physical Exam:  Vitals:    07/31/24 1014   BP: (!) 157/76   Pulse: (!) 59   Resp: 17     Physical Exam   Constitutional: She is oriented to person, place, and time. She appears not cachectic. No distress. She is obese.   HENT:   Head: Normocephalic.   Right Ear: External ear normal.   Left Ear: External ear normal.   Nose: Nose normal. No mucosal edema. No polyps.   Mouth/Throat: Oropharynx is clear and moist. Normal dentition. No oropharyngeal exudate.   Neck: No JVD present. No tracheal deviation present. No thyromegaly present.   Cardiovascular: Normal rate, regular rhythm, normal heart sounds and intact distal pulses. Exam reveals no gallop and no friction  rub.   No murmur heard.  Pulmonary/Chest: Normal expansion, symmetric chest wall expansion and effort normal. No stridor. No respiratory distress. She has no decreased breath sounds. She has no wheezes. She has no rhonchi. She has rales. Chest wall is not dull to percussion. She exhibits no tenderness. Negative for egophony. Negative for tactile fremitus.   Abdominal: Soft. Bowel sounds are normal. She exhibits no distension and no mass. There is no hepatosplenomegaly. There is no abdominal tenderness. There is no rebound and no guarding. No hernia.   Musculoskeletal:         General: No tenderness, deformity or edema. Normal range of motion.      Cervical back: Normal range of motion and neck supple.   Lymphadenopathy: No supraclavicular adenopathy is present.     She has no cervical adenopathy.     She has no axillary adenopathy.   Neurological: She is alert and oriented to person, place, and time. She displays normal reflexes. No cranial nerve deficit. She exhibits normal muscle tone.   Skin: Skin is warm and dry. No rash noted. She is not diaphoretic. No cyanosis or erythema. No pallor. Nails show no clubbing.   Psychiatric: She has a normal mood and affect. Her behavior is normal. Judgment and thought content normal.           Accessory Clinical Data:  Chest x-ray:  I have personally reviewed her chest x-ray from this morning and findings are dictated under HPI.    CT scan:  I have reviewed the CT chest report from outside facility.  I do not have access to actual images.    PFTs:  Her PFTs showed normal spirometry and lung volumes.  They are not consistent with obstruction or restriction.  Diffusion capacity is mildly reduced.  This corrects for alveolar volume however.    6MWT:     TTE:    Lab data:    All radiographic imaging of the chest, PFT tracings/data, and 6MWT data have been independently reviewed and interpreted unless otherwise specified.     Assessment and Plan:        Problem List Items Addressed  This Visit    None  Visit Diagnoses       Interstitial lung disease    -  Primary    Relevant Orders    Rheumatoid Factor    Sedimentation rate    MISHEL Screen w/Reflex    Anti-scleroderma antibody    RNA polymerase III Ab, IgG    PM-Scl Antibody by Immunodiffusion    Anti Sm/RNP Antibody    Th/To Antibody    MyoMarker Panel 3    Sjogrens syndrome-A extractable nuclear antibody    Sjogrens syndrome-B extractable nuclear antibody    Anti-Milagro 1 antibody, IgG    CK    C-Reactive Protein    Complete PFT with bronchodilator    Bronchiectasis without complication        Relevant Medications    albuterol-ipratropium (DUO-NEB) 2.5 mg-0.5 mg/3 mL nebulizer solution    albuterol (VENTOLIN HFA) 90 mcg/actuation inhaler    Other Relevant Orders    Rheumatoid Factor    Anti-scleroderma antibody    NEBULIZER FOR HOME USE    Exertional dyspnea        Chronic cough        Relevant Medications    albuterol (VENTOLIN HFA) 90 mcg/actuation inhaler    Diffusion capacity of lung (dl), decreased        Interstitial pulmonary disease, unspecified        Relevant Orders    CT Chest Without Contrast    Acquired elevated diaphragm               Consider taking her off lisinopril as a potential cause of cough, specifically if there is chronological relationship.    I do not see any other offending medication that may precipitate ILD.      Need to optimize airway clearance for mild bronchiectasis.  Need to rule out secondary causes.  Need to monitor progression and prevent infections.    Regarding pulmonary fibrosis, this seems to be subtle and will need careful watching in addition to ruling out secondary causes.  PFTs do not show restriction.  Degree of shortness of breath she is describing is somewhat out of proportion to clinical findings.  Could this be related to deconditioning and senile affect?    Obtain records, specifically result of echocardiogram from her cardiologist's office.  Obtain CT chest images on a disc from imaging center  done in May 20, 2024 for review.  Repeat high-resolution CT chest in 4 months.  Start DuoNebs q.12 hourly scheduled and give albuterol as needed inhaler.  Further workup such as bronchoscopy plus-minus biopsy and disease specific management or use of antifibrotic drugs will depend upon lab findings and clinical course.    Above was discussed with the patient, her daughter and  and all the questions were answered.    65 minutes of total time was spent the encounter, which includes face-to-face time on history and examination and non face-to-face time preparing to see the patient that includes reviewing the images, labs, PFTs, obtaining and reviewing separately obtained history, documenting clinical information in the electronic health record, independently interpreting results and communicating results to the patient, as well as care coordination.           Follow up in about 4 months (around 11/30/2024) for With labs, PFTs and CT chest.  Thank you very much for involving me in the care of this patient.  Please do not hesitate to reach me if you have any further questions or concerns.    This note is dictated on M*Modal word recognition program.  There are word recognition mistakes that are occasionally missed on review.

## 2024-08-01 LAB — RHEUMATOID FACT SERPL-ACNC: NEGATIVE [IU]/ML

## 2024-08-05 ENCOUNTER — TELEPHONE (OUTPATIENT)
Dept: UROLOGY | Facility: CLINIC | Age: 89
End: 2024-08-05
Payer: MEDICARE

## 2024-08-07 ENCOUNTER — OFFICE VISIT (OUTPATIENT)
Dept: UROLOGY | Facility: CLINIC | Age: 89
End: 2024-08-07
Payer: MEDICARE

## 2024-08-07 VITALS
OXYGEN SATURATION: 94 % | HEIGHT: 61 IN | DIASTOLIC BLOOD PRESSURE: 82 MMHG | SYSTOLIC BLOOD PRESSURE: 118 MMHG | HEART RATE: 66 BPM | BODY MASS INDEX: 33.04 KG/M2 | WEIGHT: 175 LBS

## 2024-08-07 DIAGNOSIS — N39.41 URGE INCONTINENCE: Primary | ICD-10-CM

## 2024-08-07 PROCEDURE — 1126F AMNT PAIN NOTED NONE PRSNT: CPT | Mod: CPTII,,, | Performed by: FAMILY MEDICINE

## 2024-08-07 PROCEDURE — 1159F MED LIST DOCD IN RCRD: CPT | Mod: CPTII,,, | Performed by: FAMILY MEDICINE

## 2024-08-07 PROCEDURE — 3288F FALL RISK ASSESSMENT DOCD: CPT | Mod: CPTII,,, | Performed by: FAMILY MEDICINE

## 2024-08-07 PROCEDURE — 1101F PT FALLS ASSESS-DOCD LE1/YR: CPT | Mod: CPTII,,, | Performed by: FAMILY MEDICINE

## 2024-08-07 PROCEDURE — 99215 OFFICE O/P EST HI 40 MIN: CPT | Mod: ,,, | Performed by: FAMILY MEDICINE

## 2024-08-12 LAB
ANION GAP SERPL CALC-SCNC: 8 MMOL/L (ref 3–11)
APPEARANCE, UA: CLEAR
BASOPHILS NFR BLD: 0.9 % (ref 0–3)
BILIRUB UR QL STRIP: NEGATIVE MG/DL
BUN SERPL-MCNC: 27 MG/DL (ref 7–18)
BUN/CREAT SERPL: 27.83 RATIO (ref 7–18)
CALCIUM SERPL-MCNC: 9.1 MG/DL (ref 8.8–10.5)
CHLORIDE SERPL-SCNC: 99 MMOL/L (ref 100–108)
CO2 SERPL-SCNC: 28 MMOL/L (ref 21–32)
COLOR UR: NORMAL
CREAT SERPL-MCNC: 0.97 MG/DL (ref 0.55–1.02)
EOSINOPHIL NFR BLD: 3 % (ref 1–3)
ERYTHROCYTE [DISTWIDTH] IN BLOOD BY AUTOMATED COUNT: 14.2 % (ref 12.5–18)
GFR ESTIMATION: 54
GLUCOSE (UA): NORMAL MG/DL
GLUCOSE SERPL-MCNC: 90 MG/DL (ref 70–110)
HCT VFR BLD AUTO: 36.6 % (ref 37–47)
HGB BLD-MCNC: 12 G/DL (ref 12–16)
HGB UR QL STRIP: NEGATIVE /UL
KETONES UR QL STRIP: NEGATIVE MG/DL
LEUKOCYTE ESTERASE UR QL STRIP: NEGATIVE /UL
LYMPHOCYTES NFR BLD: 28.2 % (ref 25–40)
MCH RBC QN AUTO: 27.8 PG (ref 27–31.2)
MCHC RBC AUTO-ENTMCNC: 32.8 G/DL (ref 31.8–35.4)
MCV RBC AUTO: 84.7 FL (ref 80–97)
MONOCYTES NFR BLD: 8 % (ref 1–15)
NEUTROPHILS # BLD AUTO: 3.34 10*3/UL (ref 1.8–7.7)
NEUTROPHILS NFR BLD: 59.7 % (ref 37–80)
NITRITE UR QL STRIP: NEGATIVE
NUCLEATED RED BLOOD CELLS: 0 %
PH UR STRIP: 6 PH (ref 5–9)
PLATELETS: 187 10*3/UL (ref 142–424)
POTASSIUM SERPL-SCNC: 3.3 MMOL/L (ref 3.6–5.2)
PROT UR QL STRIP: NEGATIVE MG/DL
RBC # BLD AUTO: 4.32 10*6/UL (ref 4.2–5.4)
SODIUM BLD-SCNC: 135 MMOL/L (ref 135–145)
SP GR UR STRIP: 1.01 (ref 1–1.03)
SPECIMEN COLLECTION METHOD, URINE: NORMAL
UROBILINOGEN UR STRIP-ACNC: NORMAL MG/DL
WBC # BLD: 5.6 10*3/UL (ref 4.6–10.2)

## 2024-08-16 LAB — ARUP MISCELLANEOUS TEST 1: NORMAL

## 2024-08-18 LAB — ARUP MISCELLANEOUS TEST 1: NORMAL

## 2024-08-19 DIAGNOSIS — J47.9 BRONCHIECTASIS WITHOUT COMPLICATION: ICD-10-CM

## 2024-08-19 RX ORDER — IPRATROPIUM BROMIDE AND ALBUTEROL SULFATE 2.5; .5 MG/3ML; MG/3ML
3 SOLUTION RESPIRATORY (INHALATION) EVERY 12 HOURS
Qty: 540 ML | Refills: 3 | Status: SHIPPED | OUTPATIENT
Start: 2024-08-19 | End: 2025-08-19

## 2024-08-21 ENCOUNTER — OUTSIDE PLACE OF SERVICE (OUTPATIENT)
Dept: UROLOGY | Facility: CLINIC | Age: 89
End: 2024-08-21

## 2024-08-21 DIAGNOSIS — N39.41 URGE INCONTINENCE: Primary | ICD-10-CM

## 2024-08-21 PROCEDURE — 52287 CYSTOSCOPY CHEMODENERVATION: CPT | Mod: ,,, | Performed by: UROLOGY

## 2024-08-21 RX ORDER — HYDROCODONE BITARTRATE AND ACETAMINOPHEN 7.5; 325 MG/1; MG/1
1 TABLET ORAL EVERY 6 HOURS PRN
Qty: 15 TABLET | Refills: 0 | Status: SHIPPED | OUTPATIENT
Start: 2024-08-21

## 2024-08-21 RX ORDER — CIPROFLOXACIN 500 MG/1
500 TABLET ORAL EVERY 12 HOURS
Qty: 6 TABLET | Refills: 0 | Status: SHIPPED | OUTPATIENT
Start: 2024-08-21 | End: 2024-08-24

## 2024-09-12 ENCOUNTER — OFFICE VISIT (OUTPATIENT)
Dept: UROLOGY | Facility: CLINIC | Age: 89
End: 2024-09-12
Payer: MEDICARE

## 2024-09-12 VITALS
SYSTOLIC BLOOD PRESSURE: 129 MMHG | DIASTOLIC BLOOD PRESSURE: 79 MMHG | BODY MASS INDEX: 33.04 KG/M2 | WEIGHT: 175 LBS | OXYGEN SATURATION: 95 % | HEART RATE: 74 BPM | HEIGHT: 61 IN

## 2024-09-12 DIAGNOSIS — N32.81 OVERACTIVE BLADDER: Primary | ICD-10-CM

## 2024-09-12 PROCEDURE — 1159F MED LIST DOCD IN RCRD: CPT | Mod: CPTII,S$GLB,,

## 2024-09-12 PROCEDURE — 1101F PT FALLS ASSESS-DOCD LE1/YR: CPT | Mod: CPTII,S$GLB,,

## 2024-09-12 PROCEDURE — 3288F FALL RISK ASSESSMENT DOCD: CPT | Mod: CPTII,S$GLB,,

## 2024-09-12 PROCEDURE — 1126F AMNT PAIN NOTED NONE PRSNT: CPT | Mod: CPTII,S$GLB,,

## 2024-09-12 PROCEDURE — 99213 OFFICE O/P EST LOW 20 MIN: CPT | Mod: S$GLB,,,

## 2024-09-12 PROCEDURE — 1160F RVW MEDS BY RX/DR IN RCRD: CPT | Mod: CPTII,S$GLB,,

## 2024-09-12 NOTE — PROGRESS NOTES
Subjective:       Patient ID: Luna Miles is a 88 y.o. female.    Chief Complaint: Post-op Evaluation (Cysto, botox injection (100 units))      HPI: 88-year-old female established patient presents today for follow up of Botox.  Patient has a history of overactive bladder with urge urinary incontinence.  She has tried and failed multiple oral medications and saw success from previous Botox injections.  Patient previously was experiencing urge urinary incontinence requiring her wearing multiple pads.  She was also complaining of saturating her bed linens twice per night.  She received 100 units of Botox on 08/21/2024 and reports today she has seen a 70% improvement in her overactive bladder.  She reports that she only has urinary leakage now with coughing, straining, sneezing and she is only experiencing nocturia 1 time per night but is able to make it to the restroom on time.  She denies lower urinary tract symptoms including dysuria, frequency, incomplete bladder emptying, odor, fever or chills.  Her PVR today is 36 mL       Past Medical History:   Past Medical History:   Diagnosis Date    HTN (hypertension)     Hyperlipidemia     Hyperthyroidism     IBS (irritable bowel syndrome)     Incontinence of urine     OA (osteoarthritis)     Peripheral neuropathy        Past Surgical Historical:   Past Surgical History:   Procedure Laterality Date    CARPAL TUNNEL RELEASE Right     CYSTOSCOPY  08/21/2024    Botox injection (100 units)    FORAMINOTOMY  02/03/2020    L3-L5 MIS-[facility]    HEMILAMINECTOMY  02/03/2020    L3-L5 MIS -[facility]    HYSTERECTOMY      HYSTERECTOMY      KNEE SURGERY Bilateral     TKA- [facility]     ROBOT-ASSISTED REPAIR OF BLADDER      SHOULDER SURGERY Left 12/09/2020    Reverse shoulder [facility]    SHOULDER SURGERY Right     RTC Repair-[facility]    SHOULDER SURGERY Left 12/09/2020    [facility] / [facility]    TONSILLECTOMY      VAGINAL PROLAPSE REPAIR      Patient had a vaginal vault prolapse repair via a SSL  suspension in 2015        Medications:   Medication List with Changes/Refills   Current Medications    ALBUTEROL (VENTOLIN HFA) 90 MCG/ACTUATION INHALER    Inhale 2 puffs into the lungs every 6 (six) hours as needed for Wheezing or Shortness of Breath. Rescue    ALBUTEROL-IPRATROPIUM (DUO-NEB) 2.5 MG-0.5 MG/3 ML NEBULIZER SOLUTION    Take 3 mLs by nebulization every 12 (twelve) hours. Rescue    AMLODIPINE (NORVASC) 2.5 MG TABLET    Take 5 mg by mouth once daily.    ASPIRIN (ECOTRIN) 81 MG EC TABLET    Take 81 mg by mouth once daily.    BISOPROLOL (ZEBETA) 5 MG TABLET        DENTA 5000 PLUS 1.1 % CREA    USE AS DIRECTED TWICE DAILY    FUROSEMIDE (LASIX) 20 MG TABLET        HYDROCODONE-ACETAMINOPHEN (NORCO) 7.5-325 MG PER TABLET    Take 1 tablet by mouth every 6 (six) hours as needed for Pain.    KETOCONAZOLE (NIZORAL) 2 % SHAMPOO    Apply 2 % topically once a week.    LATANOPROST 0.005 % OPHTHALMIC SOLUTION        LISINOPRIL-HYDROCHLOROTHIAZIDE (PRINZIDE,ZESTORETIC) 20-25 MG TAB        POTASSIUM CHLORIDE (KLOR-CON) 10 MEQ TBSR    Take 10 mEq by mouth once daily.    ROSUVASTATIN (CRESTOR) 10 MG TABLET        SYNTHROID 88 MCG TABLET    Take 88 mcg by mouth once daily.        Past Social History:   Social History     Socioeconomic History    Marital status:    Tobacco Use    Smoking status: Never    Smokeless tobacco: Never   Substance and Sexual Activity    Alcohol use: Never    Drug use: Never       Allergies:   Review of patient's allergies indicates:   Allergen Reactions    Lipitor [atorvastatin]         Family History:   Family History   Problem Relation Name Age of Onset    Hypertension Mother      Hypertension Father          Review of Systems:  Review of Systems   Constitutional:  Negative for activity change, appetite change, chills, diaphoresis, fatigue, fever and unexpected weight change.   HENT:  Negative for congestion, dental problem, drooling, ear discharge, ear pain, facial swelling, hearing loss,  mouth sores, nosebleeds, postnasal drip, rhinorrhea, sinus pressure, sinus pain, sneezing, sore throat, tinnitus, trouble swallowing and voice change.    Eyes:  Negative for photophobia, pain, discharge, redness, itching and visual disturbance.   Respiratory:  Negative for apnea, cough, choking, chest tightness, shortness of breath, wheezing and stridor.    Cardiovascular:  Negative for chest pain and leg swelling.   Gastrointestinal:  Negative for abdominal distention, abdominal pain, anal bleeding, blood in stool, constipation, diarrhea, nausea, rectal pain and vomiting.   Endocrine: Negative for cold intolerance, heat intolerance, polydipsia, polyphagia and polyuria.   Genitourinary: Negative.  Negative for decreased urine volume, difficulty urinating, dyspareunia, dysuria, enuresis, flank pain, frequency, genital sores, hematuria, menstrual problem, pelvic pain, urgency, vaginal bleeding, vaginal discharge and vaginal pain.        Stress incontinence   Musculoskeletal:  Negative for arthralgias, back pain, gait problem, joint swelling, myalgias, neck pain and neck stiffness.   Skin:  Negative for color change, pallor, rash and wound.   Allergic/Immunologic: Negative for environmental allergies, food allergies and immunocompromised state.   Neurological:  Negative for dizziness, tremors, seizures, syncope, facial asymmetry, speech difficulty, weakness, light-headedness, numbness and headaches.   Hematological:  Negative for adenopathy. Does not bruise/bleed easily.   Psychiatric/Behavioral:  Negative for agitation, behavioral problems, confusion, decreased concentration, dysphoric mood, hallucinations, self-injury, sleep disturbance and suicidal ideas. The patient is not nervous/anxious and is not hyperactive.      Physical Exam:  Physical Exam  Cardiovascular:      Rate and Rhythm: Normal rate.   Pulmonary:      Effort: Pulmonary effort is normal.   Abdominal:      General: Abdomen is flat. Bowel sounds are  normal.      Palpations: Abdomen is soft.   Neurological:      Mental Status: She is alert and oriented to person, place, and time.   PVR: 36 mL  Assessment/Plan:     Overactive bladder/urge urinary incontinence:  Patient did well with 100 units of Botox.  She denies lower urinary tract symptoms.  Discussed effects of caffeine, tea, sodas, and alcohol on OAB symptoms. Instructed patient to decrease consumption of these fluids and to stop oral fluids approx 4 hours prior to bedtime to decrease night time symptoms. Education provided on bladder training, bladder control strategies, and PFT. Patient verbalized understanding.    Follow up as needed   Problem List Items Addressed This Visit    None  Visit Diagnoses       Overactive bladder    -  Primary    Relevant Orders    POCT Bladder Scan

## 2024-09-16 DIAGNOSIS — J47.9 BRONCHIECTASIS WITHOUT COMPLICATION: ICD-10-CM

## 2024-09-16 RX ORDER — IPRATROPIUM BROMIDE AND ALBUTEROL SULFATE 2.5; .5 MG/3ML; MG/3ML
SOLUTION RESPIRATORY (INHALATION)
Qty: 540 ML | Refills: 3 | Status: SHIPPED | OUTPATIENT
Start: 2024-09-16

## 2024-11-19 DIAGNOSIS — J47.9 BRONCHIECTASIS WITHOUT COMPLICATION: ICD-10-CM

## 2024-11-20 RX ORDER — IPRATROPIUM BROMIDE AND ALBUTEROL SULFATE 2.5; .5 MG/3ML; MG/3ML
3 SOLUTION RESPIRATORY (INHALATION) EVERY 6 HOURS PRN
Qty: 360 EACH | Refills: 3 | Status: SHIPPED | OUTPATIENT
Start: 2024-11-20

## 2024-12-04 ENCOUNTER — TELEPHONE (OUTPATIENT)
Dept: PULMONOLOGY | Facility: CLINIC | Age: 89
End: 2024-12-04
Payer: MEDICARE

## 2024-12-17 ENCOUNTER — TELEPHONE (OUTPATIENT)
Dept: PULMONOLOGY | Facility: CLINIC | Age: 89
End: 2024-12-17
Payer: MEDICARE

## 2025-01-27 DIAGNOSIS — J84.10 PULMONARY FIBROSIS: Primary | ICD-10-CM

## 2025-02-03 ENCOUNTER — OFFICE VISIT (OUTPATIENT)
Dept: PULMONOLOGY | Facility: CLINIC | Age: OVER 89
End: 2025-02-03
Payer: MEDICARE

## 2025-02-03 ENCOUNTER — CLINICAL SUPPORT (OUTPATIENT)
Dept: PULMONOLOGY | Facility: CLINIC | Age: OVER 89
End: 2025-02-03
Payer: MEDICARE

## 2025-02-03 VITALS
WEIGHT: 175 LBS | OXYGEN SATURATION: 90 % | SYSTOLIC BLOOD PRESSURE: 100 MMHG | RESPIRATION RATE: 18 BRPM | BODY MASS INDEX: 33.04 KG/M2 | DIASTOLIC BLOOD PRESSURE: 60 MMHG | HEIGHT: 61 IN

## 2025-02-03 DIAGNOSIS — J84.10 PULMONARY FIBROSIS: ICD-10-CM

## 2025-02-03 DIAGNOSIS — R06.09 EXERTIONAL DYSPNEA: Primary | ICD-10-CM

## 2025-02-03 DIAGNOSIS — J98.6 ACQUIRED ELEVATED DIAPHRAGM: ICD-10-CM

## 2025-02-03 PROCEDURE — 1159F MED LIST DOCD IN RCRD: CPT | Mod: CPTII,,, | Performed by: INTERNAL MEDICINE

## 2025-02-03 PROCEDURE — 99214 OFFICE O/P EST MOD 30 MIN: CPT | Mod: S$PBB,25,, | Performed by: INTERNAL MEDICINE

## 2025-02-03 NOTE — PROGRESS NOTES
Subjective:    Patient Identification:   Patient ID: Luna Miles is a 89 y.o. female.    Referring Provider:  Established patient    Chief Complaint:  Exertional dyspnea    History of Present Illness:    Luna Miles is a 89 y.o. female who presents for the follow-up on CT chest and connective tissue disease workup.  Her PFTs today do not show any restriction or obstruction.  Diffusion capacity is low but corrects for alveolar volume.  Her CT chest does not show any peripheral interstitial fibrosis or bronchiectasis or pleural effusion.  There is mild perihilar atelectasis and dependent atelectasis which shifts from supine to prone positioning.  This argues against interstitial lung disease.  Her cough is significantly better.  She still takes lisinopril.  Extensive connective tissue disease workup was normal.  She does take breathing treatments twice a day.    Review of Systems:  Review of Systems   Constitutional:  Negative for fever, chills, weight loss, weight gain, activity change, appetite change, fatigue, night sweats and weakness.   HENT:  Negative for nosebleeds, postnasal drip, rhinorrhea, sinus pressure, sore throat, trouble swallowing, voice change, congestion, ear pain and hearing loss.    Eyes:  Negative for redness and itching.   Respiratory:  Positive for cough and dyspnea on extertion. Negative for hemoptysis, sputum production, choking, chest tightness, wheezing, orthopnea, previous hospitialization due to pulmonary problems, asthma nighttime symptoms, pleurisy, use of rescue inhaler and Paroxysmal Nocturnal Dyspnea.    Cardiovascular:  Negative for chest pain, palpitations and leg swelling.   Genitourinary:  Negative for difficulty urinating and hematuria.   Endocrine:  Negative for polydipsia, polyphagia, cold intolerance, heat intolerance and polyuria.    Musculoskeletal:  Negative for arthralgias, back pain, gait problem, joint swelling and myalgias.   Skin:  Negative for  rash.   Gastrointestinal:  Negative for nausea, vomiting, abdominal pain, abdominal distention and acid reflux.   Neurological:  Negative for dizziness, syncope, weakness, light-headedness and headaches.   Hematological:  Negative for adenopathy. Does not bruise/bleed easily and no excessive bruising.   Psychiatric/Behavioral:  Negative for confusion and sleep disturbance. The patient is not nervous/anxious.          Allergies:   Review of patient's allergies indicates:   Allergen Reactions    Lipitor [atorvastatin]        Medications:      Past Medical History:      Past Medical History:   Diagnosis Date    HTN (hypertension)     Hyperlipidemia     Hyperthyroidism     IBS (irritable bowel syndrome)     Incontinence of urine     OA (osteoarthritis)     Peripheral neuropathy        Family History:      Family History   Problem Relation Name Age of Onset    Hypertension Mother      Hypertension Father          Social History:      Past Surgical History:   Procedure Laterality Date    CARPAL TUNNEL RELEASE Right     CYSTOSCOPY  08/21/2024    Botox injection (100 units)    FORAMINOTOMY  02/03/2020    L3-L5 MIS-[facility]    HEMILAMINECTOMY  02/03/2020    L3-L5 MIS -[facility]    HYSTERECTOMY      HYSTERECTOMY      KNEE SURGERY Bilateral     T[facility]- [facility]     ROBOT-ASSISTED REPAIR OF BLADDER      SHOULDER SURGERY Left 12/09/2020    Reverse shoulder [facility]    SHOULDER SURGERY Right     RTC Repair-[facility]    SHOULDER SURGERY Left 12/09/2020    [facility] / [facility]    TONSILLECTOMY      VAGINAL PROLAPSE REPAIR      Patient had a vaginal vault prolapse repair via a SSL suspension in 2015       Physical Exam:  Vitals:    02/03/25 1212   BP: 100/60   Resp: 18     Physical Exam   Constitutional: She is oriented to person, place, and time. She appears not cachectic. No distress.   HENT:   Head: Normocephalic.   Nose: Nose normal. No mucosal edema. No polyps.   Mouth/Throat: Oropharynx is clear and moist. Normal dentition. No oropharyngeal exudate.    Neck: No JVD present. No tracheal deviation present. No thyromegaly present.   Cardiovascular: Normal rate, regular rhythm, normal heart sounds and intact distal pulses. Exam reveals no gallop and no friction rub.   No murmur heard.  Pulmonary/Chest: Normal expansion, symmetric chest wall expansion, effort normal and breath sounds normal. No stridor. No respiratory distress. She has no decreased breath sounds. She has no wheezes. She has no rhonchi. She has no rales. Chest wall is not dull to percussion. She exhibits no tenderness. Negative for egophony. Negative for tactile fremitus.   Abdominal: Soft. Bowel sounds are normal. She exhibits no distension and no mass. There is no hepatosplenomegaly. There is no abdominal tenderness. There is no rebound and no guarding. No hernia.   Musculoskeletal:         General: No tenderness, deformity or edema. Normal range of motion.      Cervical back: Normal range of motion and neck supple.   Lymphadenopathy: No supraclavicular adenopathy is present.     She has no cervical adenopathy.     She has no axillary adenopathy.   Neurological: She is alert and oriented to person, place, and time. She displays normal reflexes. No cranial nerve deficit. She exhibits normal muscle tone.   Skin: Skin is warm and dry. No rash noted. She is not diaphoretic. No cyanosis or erythema. No pallor. Nails show no clubbing.   Psychiatric: She has a normal mood and affect. Her behavior is normal. Judgment and thought content normal.       Accessory Clinical Data:  Chest x-ray:    CT scan:  No ILD    PFTs:  Normal profile    6MWT:     TTE:    Lab data:    All radiographic imaging of the chest, PFT tracings/data, and 6MWT data have been independently reviewed and interpreted unless otherwise specified.     Assessment and Plan:        Problem List Items Addressed This Visit    None  Visit Diagnoses       Exertional dyspnea    -  Primary    Acquired elevated diaphragm               Multifactorial  shortness of breath on exertion such as age, deconditioning and elevated hemidiaphragm.  There is no evidence of obstructive or restrictive pulmonary pattern present on PFT.  CT chest is not consistent with interstitial lung disease.  She has periods of sudden shortness of breath which could be reactive airway disease.  Recommend to continue to take rescue inhalers during these episodes or prior to activities that involve exertion to prevent shortness of breath.  Cough is significantly better, once again recommend not to use  ACE inhibitors if possible.          Follow up in about 1 year (around 2/3/2026), or if symptoms worsen or fail to improve.     This note is dictated on M*Modal word recognition program.  There are word recognition mistakes that are occasionally missed on review.

## 2025-02-04 PROCEDURE — 94726 PLETHYSMOGRAPHY LUNG VOLUMES: CPT | Mod: 26,S$PBB,, | Performed by: INTERNAL MEDICINE

## 2025-02-04 PROCEDURE — 94729 DIFFUSING CAPACITY: CPT | Mod: 26,S$PBB,, | Performed by: INTERNAL MEDICINE

## 2025-02-04 PROCEDURE — 94010 BREATHING CAPACITY TEST: CPT | Mod: 26,S$PBB,, | Performed by: INTERNAL MEDICINE

## 2025-03-26 ENCOUNTER — TELEPHONE (OUTPATIENT)
Dept: UROLOGY | Facility: CLINIC | Age: OVER 89
End: 2025-03-26
Payer: MEDICARE

## 2025-03-26 NOTE — TELEPHONE ENCOUNTER
Spoke with pt in regards to scheduling an appt to discuss botox procedure. Scheduled 04/03/25 with Katie DALEY NP. Verbalized confirmation.       ----- Message from Mai sent at 3/26/2025  2:28 PM CDT -----  Contact: MARY MALONEY [[id number]]  .Type:  Sooner Apoointment RequestCaller is requesting a sooner appointment.  Caller declined first available appointment listed below.  Caller will not accept being placed on the waitlist and is requesting a message be sent to doctor.Name of Caller:MARY MALONEY [[id number]]When is the first available appointment?  4/30/25Symptoms:   botox follow up Would the patient rather a call back or a response via [facility]? Call Best Call Back Number:[phone] (home) Additional Information:

## 2025-04-03 ENCOUNTER — OFFICE VISIT (OUTPATIENT)
Dept: UROLOGY | Facility: CLINIC | Age: OVER 89
End: 2025-04-03
Payer: MEDICARE

## 2025-04-03 VITALS
BODY MASS INDEX: 32.66 KG/M2 | SYSTOLIC BLOOD PRESSURE: 130 MMHG | DIASTOLIC BLOOD PRESSURE: 60 MMHG | HEIGHT: 61 IN | WEIGHT: 173 LBS | HEART RATE: 73 BPM

## 2025-04-03 DIAGNOSIS — N39.41 URGE INCONTINENCE: ICD-10-CM

## 2025-04-03 DIAGNOSIS — N32.81 OVERACTIVE BLADDER: Primary | ICD-10-CM

## 2025-04-03 PROCEDURE — 1126F AMNT PAIN NOTED NONE PRSNT: CPT | Mod: CPTII,,, | Performed by: FAMILY MEDICINE

## 2025-04-03 PROCEDURE — 1101F PT FALLS ASSESS-DOCD LE1/YR: CPT | Mod: CPTII,,, | Performed by: FAMILY MEDICINE

## 2025-04-03 PROCEDURE — 3288F FALL RISK ASSESSMENT DOCD: CPT | Mod: CPTII,,, | Performed by: FAMILY MEDICINE

## 2025-04-03 PROCEDURE — 99215 OFFICE O/P EST HI 40 MIN: CPT | Mod: S$PBB,,, | Performed by: FAMILY MEDICINE

## 2025-04-03 RX ORDER — SODIUM FLUORIDE1.1%, POTASSIUM NITRATE 5% 5.8; 57.5 MG/ML; MG/ML
GEL, DENTIFRICE DENTAL
COMMUNITY
Start: 2024-11-14

## 2025-04-03 RX ORDER — TRIAMCINOLONE ACETONIDE 0.25 MG/G
CREAM TOPICAL
COMMUNITY
Start: 2025-01-13

## 2025-04-03 RX ORDER — KETOCONAZOLE 20 MG/G
CREAM TOPICAL
COMMUNITY
Start: 2025-01-17

## 2025-04-03 NOTE — PROGRESS NOTES
Subjective:       Patient ID: Luna Miles is a 89 y.o. female.    Chief Complaint: Urinary Incontinence      HPI: 89-year-old female patient presenting to the clinic to follow up for urinary urge incontinence.  Patient has had 4 rounds of Botox in the past.  She is seen in increase in her frequency and urgency with incontinence lately.  She would like to be set up for Botox again         Past Medical History:   Past Medical History:   Diagnosis Date    HTN (hypertension)     Hyperlipidemia     Hyperthyroidism     IBS (irritable bowel syndrome)     Incontinence of urine     OA (osteoarthritis)     Peripheral neuropathy        Past Surgical Historical:   Past Surgical History:   Procedure Laterality Date    CARPAL TUNNEL RELEASE Right     CYSTOSCOPY  08/21/2024    Botox injection (100 units)    FORAMINOTOMY  02/03/2020    L3-L5 MIS-[facility]    HEMILAMINECTOMY  02/03/2020    L3-L5 MIS -[facility]    HYSTERECTOMY      HYSTERECTOMY      KNEE SURGERY Bilateral     TKA- [facility]     ROBOT-ASSISTED REPAIR OF BLADDER      SHOULDER SURGERY Left 12/09/2020    Reverse shoulder [facility]    SHOULDER SURGERY Right     RTC Repair[facility]    SHOULDER SURGERY Left 12/09/2020    [facility] / [facility]    TONSILLECTOMY      VAGINAL PROLAPSE REPAIR      Patient had a vaginal vault prolapse repair via a SSL suspension in 2015        Medications:   Medication List with Changes/Refills   Current Medications    ALBUTEROL (VENTOLIN HFA) 90 MCG/ACTUATION INHALER    Inhale 2 puffs into the lungs every 6 (six) hours as needed for Wheezing or Shortness of Breath. Rescue    ALBUTEROL-IPRATROPIUM (DUO-NEB) 2.5 MG-0.5 MG/3 ML NEBULIZER SOLUTION    Take 3 mLs by nebulization every 6 (six) hours as needed for Wheezing or Shortness of Breath.    AMLODIPINE (NORVASC) 2.5 MG TABLET    Take 5 mg by mouth once daily.    ASPIRIN (ECOTRIN) 81 MG EC TABLET    Take 81 mg by mouth once daily.    BISOPROLOL (ZEBETA) 5 MG TABLET        DENTA 5000 PLUS 1.1 % CREA    USE  AS DIRECTED TWICE DAILY    FUROSEMIDE (LASIX) 20 MG TABLET        KETOCONAZOLE (NIZORAL) 2 % CREAM    APPLY SMALL AMOUNT TOPICALLY TO THE AFFECTED AREA DAILY    KETOCONAZOLE (NIZORAL) 2 % SHAMPOO    Apply 2 % topically once a week.    LATANOPROST 0.005 % OPHTHALMIC SOLUTION        LISINOPRIL-HYDROCHLOROTHIAZIDE (PRINZIDE,ZESTORETIC) 20-25 MG TAB        POTASSIUM CHLORIDE (KLOR-CON) 10 MEQ TBSR    Take 10 mEq by mouth once daily.    ROSUVASTATIN (CRESTOR) 10 MG TABLET        SODIUM FLUORIDE-POT NITRATE (PREVIDENT 5000 SENSITIVE) 1.1-5 % PSTE    USE BY MOUTH TWICE DAILY    SYNTHROID 88 MCG TABLET    Take 88 mcg by mouth once daily.    TRIAMCINOLONE ACETONIDE 0.025% (KENALOG) 0.025 % CREAM    APPLY THIN LAYER TOPICALLY TO THE AFFECTED AREA TWICE DAILY   Discontinued Medications    HYDROCODONE-ACETAMINOPHEN (NORCO) 7.5-325 MG PER TABLET    Take 1 tablet by mouth every 6 (six) hours as needed for Pain.        Past Social History: Social History[1]    Allergies:   Review of patient's allergies indicates:   Allergen Reactions    Lipitor [atorvastatin]         Family History:   Family History   Problem Relation Name Age of Onset    Hypertension Mother      Hypertension Father          Review of Systems:  Review of Systems   Constitutional:  Negative for activity change, appetite change, chills, diaphoresis, fatigue, fever and unexpected weight change.   HENT:  Negative for congestion, dental problem, drooling, ear discharge, ear pain, facial swelling, hearing loss, mouth sores, nosebleeds, postnasal drip, rhinorrhea, sinus pressure, sinus pain, sneezing, sore throat, tinnitus, trouble swallowing and voice change.    Eyes:  Negative for photophobia, pain, discharge, redness, itching and visual disturbance.   Respiratory:  Negative for apnea, cough, choking, chest tightness, shortness of breath, wheezing and stridor.    Cardiovascular:  Negative for chest pain and leg swelling.   Gastrointestinal:  Negative for abdominal  distention, abdominal pain, anal bleeding, blood in stool, constipation, diarrhea, nausea, rectal pain and vomiting.   Endocrine: Negative for cold intolerance, heat intolerance, polydipsia, polyphagia and polyuria.   Genitourinary:  Positive for frequency and urgency. Negative for decreased urine volume, difficulty urinating, dyspareunia, dysuria, enuresis, flank pain, genital sores, hematuria, menstrual problem, pelvic pain, vaginal bleeding, vaginal discharge and vaginal pain.   Musculoskeletal:  Negative for arthralgias, back pain, gait problem, joint swelling, myalgias, neck pain and neck stiffness.   Skin:  Negative for color change, pallor, rash and wound.   Allergic/Immunologic: Negative for environmental allergies, food allergies and immunocompromised state.   Neurological:  Negative for dizziness, tremors, seizures, syncope, facial asymmetry, speech difficulty, weakness, light-headedness, numbness and headaches.   Hematological:  Negative for adenopathy. Does not bruise/bleed easily.   Psychiatric/Behavioral:  Negative for agitation, behavioral problems, confusion, decreased concentration, dysphoric mood, hallucinations, self-injury, sleep disturbance and suicidal ideas. The patient is not nervous/anxious and is not hyperactive.        Physical Exam:  Physical Exam  Exam conducted with a chaperone present.   Constitutional:       Appearance: Normal appearance.   HENT:      Head: Normocephalic.      Nose: Nose normal.      Mouth/Throat:      Mouth: Mucous membranes are moist.      Pharynx: Oropharynx is clear.   Eyes:      Extraocular Movements: Extraocular movements intact.      Conjunctiva/sclera: Conjunctivae normal.      Pupils: Pupils are equal, round, and reactive to light.   Cardiovascular:      Rate and Rhythm: Normal rate and regular rhythm.      Pulses: Normal pulses.      Heart sounds: Normal heart sounds.   Pulmonary:      Effort: Pulmonary effort is normal.      Breath sounds: Normal breath  sounds.   Abdominal:      General: Abdomen is flat. Bowel sounds are normal.      Palpations: Abdomen is soft.      Hernia: There is no hernia in the left inguinal area or right inguinal area.   Genitourinary:     General: Normal vulva.      Pubic Area: No rash or pubic lice.       Labia:         Right: No rash, tenderness, lesion or injury.         Left: No rash, tenderness, lesion or injury.       Urethra: No prolapse, urethral pain, urethral swelling or urethral lesion.      Rectum: Normal.   Musculoskeletal:         General: Normal range of motion.      Cervical back: Normal range of motion and neck supple.   Lymphadenopathy:      Lower Body: No right inguinal adenopathy. No left inguinal adenopathy.   Skin:     General: Skin is warm and dry.      Capillary Refill: Capillary refill takes less than 2 seconds.   Neurological:      General: No focal deficit present.      Mental Status: She is alert and oriented to person, place, and time. Mental status is at baseline.   Psychiatric:         Mood and Affect: Mood normal.         Behavior: Behavior normal.         Thought Content: Thought content normal.         Judgment: Judgment normal.         Assessment/Plan:     Urinary frequency and urgency.  PVR 30 mL.  We will proceed with Botox 100 units with Dr. Aguila at the next available date pending clearances  Problem List Items Addressed This Visit    None  Visit Diagnoses         Overactive bladder    -  Primary    Relevant Orders    Ambulatory Referral to External Surgery                    [1]   Social History  Socioeconomic History    Marital status:    Tobacco Use    Smoking status: Never    Smokeless tobacco: Never   Substance and Sexual Activity    Alcohol use: Never    Drug use: Never

## 2025-04-23 ENCOUNTER — OUTSIDE PLACE OF SERVICE (OUTPATIENT)
Dept: UROLOGY | Facility: CLINIC | Age: OVER 89
End: 2025-04-23

## 2025-04-23 DIAGNOSIS — N32.81 OVERACTIVE BLADDER: Primary | ICD-10-CM

## 2025-04-23 DIAGNOSIS — N39.41 URGE INCONTINENCE: ICD-10-CM

## 2025-04-23 RX ORDER — CIPROFLOXACIN 500 MG/1
500 TABLET ORAL 2 TIMES DAILY
Qty: 6 TABLET | Refills: 0 | Status: SHIPPED | OUTPATIENT
Start: 2025-04-23 | End: 2025-04-26

## 2025-04-23 RX ORDER — HYDROCODONE BITARTRATE AND ACETAMINOPHEN 10; 325 MG/1; MG/1
1 TABLET ORAL EVERY 6 HOURS PRN
Qty: 12 TABLET | Refills: 0 | Status: SHIPPED | OUTPATIENT
Start: 2025-04-23

## 2025-05-15 ENCOUNTER — OFFICE VISIT (OUTPATIENT)
Dept: UROLOGY | Facility: CLINIC | Age: OVER 89
End: 2025-05-15
Payer: MEDICARE

## 2025-05-15 VITALS
WEIGHT: 173.06 LBS | HEIGHT: 61 IN | BODY MASS INDEX: 32.67 KG/M2 | SYSTOLIC BLOOD PRESSURE: 130 MMHG | HEART RATE: 70 BPM | DIASTOLIC BLOOD PRESSURE: 60 MMHG | RESPIRATION RATE: 19 BRPM

## 2025-05-15 DIAGNOSIS — N32.81 OVERACTIVE BLADDER: Primary | ICD-10-CM

## 2025-05-15 PROCEDURE — 1159F MED LIST DOCD IN RCRD: CPT | Mod: CPTII,,, | Performed by: UROLOGY

## 2025-05-15 PROCEDURE — 99214 OFFICE O/P EST MOD 30 MIN: CPT | Mod: S$PBB,,, | Performed by: UROLOGY

## 2025-05-15 PROCEDURE — 1126F AMNT PAIN NOTED NONE PRSNT: CPT | Mod: CPTII,,, | Performed by: UROLOGY

## 2025-05-15 NOTE — PROGRESS NOTES
Subjective:       Patient ID: Luna iMles is a 89 y.o. female.    Chief Complaint: post botox      HPI:  89-year-old female patient presenting to clinic for follow-up post on 100 units of Botox on 04/23/2025.  Patient has seen some improvement in her urinary frequency and urgency.  She is still having some mild leakage.  She has had multiple rounds of Botox.    Past Medical History:   Past Medical History:   Diagnosis Date    HTN (hypertension)     Hyperlipidemia     Hyperthyroidism     IBS (irritable bowel syndrome)     Incontinence of urine     OA (osteoarthritis)     Peripheral neuropathy        Past Surgical Historical:   Past Surgical History:   Procedure Laterality Date    CARPAL TUNNEL RELEASE Right     CYSTOSCOPY  08/21/2024    Botox injection (100 units)    FORAMINOTOMY  02/03/2020    L3-L5 MIS-[facility]    HEMILAMINECTOMY  02/03/2020    L3-L5 MIS -[facility]    HYSTERECTOMY      HYSTERECTOMY      KNEE SURGERY Bilateral     [facility]- [facility]     ROBOT-ASSISTED REPAIR OF BLADDER      SHOULDER SURGERY Left 12/09/2020    Reverse shoulder [facility]    SHOULDER SURGERY Right     RTC Repair-[facility]    SHOULDER SURGERY Left 12/09/2020    [facility] / [facility]    TONSILLECTOMY      VAGINAL PROLAPSE REPAIR      Patient had a vaginal vault prolapse repair via a SSL suspension in 2015        Medications:   Medication List with Changes/Refills   Current Medications    ALBUTEROL (VENTOLIN HFA) 90 MCG/ACTUATION INHALER    Inhale 2 puffs into the lungs every 6 (six) hours as needed for Wheezing or Shortness of Breath. Rescue    ALBUTEROL-IPRATROPIUM (DUO-NEB) 2.5 MG-0.5 MG/3 ML NEBULIZER SOLUTION    Take 3 mLs by nebulization every 6 (six) hours as needed for Wheezing or Shortness of Breath.    AMLODIPINE (NORVASC) 2.5 MG TABLET    Take 5 mg by mouth once daily.    ASPIRIN (ECOTRIN) 81 MG EC TABLET    Take 81 mg by mouth once daily.    BISOPROLOL (ZEBETA) 5 MG TABLET        DENTA 5000 PLUS 1.1 % CREA    USE AS DIRECTED TWICE DAILY     FUROSEMIDE (LASIX) 20 MG TABLET        HYDROCODONE-ACETAMINOPHEN (NORCO)  MG PER TABLET    Take 1 tablet by mouth every 6 (six) hours as needed for Pain.    KETOCONAZOLE (NIZORAL) 2 % CREAM    APPLY SMALL AMOUNT TOPICALLY TO THE AFFECTED AREA DAILY    KETOCONAZOLE (NIZORAL) 2 % SHAMPOO    Apply 2 % topically once a week.    LATANOPROST 0.005 % OPHTHALMIC SOLUTION        LISINOPRIL-HYDROCHLOROTHIAZIDE (PRINZIDE,ZESTORETIC) 20-25 MG TAB        POTASSIUM CHLORIDE (KLOR-CON) 10 MEQ TBSR    Take 10 mEq by mouth once daily.    ROSUVASTATIN (CRESTOR) 10 MG TABLET        SODIUM FLUORIDE-POT NITRATE (PREVIDENT 5000 SENSITIVE) 1.1-5 % PSTE    USE BY MOUTH TWICE DAILY    SYNTHROID 88 MCG TABLET    Take 88 mcg by mouth once daily.    TRIAMCINOLONE ACETONIDE 0.025% (KENALOG) 0.025 % CREAM    APPLY THIN LAYER TOPICALLY TO THE AFFECTED AREA TWICE DAILY        Past Social History: Social History[1]    Allergies:   Review of patient's allergies indicates:   Allergen Reactions    Lipitor [atorvastatin]         Family History:   Family History   Problem Relation Name Age of Onset    Hypertension Mother      Hypertension Father          Review of Systems:  Review of Systems   Constitutional:  Negative for activity change and appetite change.   HENT:  Negative for congestion and dental problem.    Respiratory:  Negative for chest tightness and shortness of breath.    Cardiovascular:  Negative for chest pain.   Gastrointestinal:  Negative for abdominal distention and abdominal pain.   Genitourinary:  Positive for frequency and urgency. Negative for decreased urine volume, difficulty urinating, dyspareunia, dysuria, enuresis, flank pain, genital sores, hematuria and pelvic pain.   Musculoskeletal:  Negative for back pain and neck pain.   Allergic/Immunologic: Negative for immunocompromised state.   Neurological:  Negative for dizziness.   Hematological:  Negative for adenopathy.   Psychiatric/Behavioral:  Negative for agitation,  behavioral problems and confusion.        Physical Exam:  Physical Exam  Constitutional:       Appearance: She is well-developed.   HENT:      Head: Normocephalic and atraumatic.      Right Ear: External ear normal.      Left Ear: External ear normal.   Eyes:      Conjunctiva/sclera: Conjunctivae normal.   Neck:      Vascular: No JVD.   Cardiovascular:      Rate and Rhythm: Normal rate and regular rhythm.   Pulmonary:      Effort: Pulmonary effort is normal. No respiratory distress.      Breath sounds: Normal breath sounds. No wheezing.   Abdominal:      General: There is no distension.      Palpations: Abdomen is soft.      Tenderness: There is no abdominal tenderness. There is no rebound.   Musculoskeletal:         General: Normal range of motion.      Cervical back: Normal range of motion.   Skin:     General: Skin is warm and dry.      Findings: No erythema or rash.   Neurological:      Mental Status: She is alert and oriented to person, place, and time.   Psychiatric:         Behavior: Behavior normal.         Assessment/Plan:     Postop Botox.   mL.  Doing well.  Complete urine drop off for any symptoms of infection in the future.  Follow-up when symptoms of frequency and urgency return  Problem List Items Addressed This Visit    None                [1]   Social History  Socioeconomic History    Marital status:    Tobacco Use    Smoking status: Never    Smokeless tobacco: Never   Substance and Sexual Activity    Alcohol use: Never    Drug use: Never